# Patient Record
Sex: MALE | Race: WHITE | Employment: OTHER | URBAN - METROPOLITAN AREA
[De-identification: names, ages, dates, MRNs, and addresses within clinical notes are randomized per-mention and may not be internally consistent; named-entity substitution may affect disease eponyms.]

---

## 2018-01-03 ENCOUNTER — ANESTHESIA EVENT (OUTPATIENT)
Dept: SURGERY | Age: 68
DRG: 657 | End: 2018-01-03
Payer: MEDICARE

## 2018-01-04 ENCOUNTER — ANESTHESIA (OUTPATIENT)
Dept: SURGERY | Age: 68
DRG: 657 | End: 2018-01-04
Payer: MEDICARE

## 2018-01-04 ENCOUNTER — HOSPITAL ENCOUNTER (INPATIENT)
Age: 68
LOS: 3 days | Discharge: HOME | DRG: 657 | End: 2018-01-07
Attending: UROLOGY | Admitting: UROLOGY
Payer: MEDICARE

## 2018-01-04 PROBLEM — N28.89 RENAL MASS: Status: ACTIVE | Noted: 2018-01-04

## 2018-01-04 LAB
ABO + RH BLD: NORMAL
BLOOD GROUP ANTIBODIES SERPL: NORMAL
BUN BLD-MCNC: 24 MG/DL (ref 7–18)
CHLORIDE BLD-SCNC: 105 MMOL/L (ref 100–108)
GLUCOSE BLD STRIP.AUTO-MCNC: 113 MG/DL (ref 74–106)
HCT VFR BLD CALC: 45 % (ref 36–49)
HGB BLD-MCNC: 15.3 G/DL (ref 12–16)
POTASSIUM BLD-SCNC: 4 MMOL/L (ref 3.5–5.5)
SODIUM BLD-SCNC: 141 MMOL/L (ref 136–145)
SPECIMEN EXP DATE BLD: NORMAL

## 2018-01-04 PROCEDURE — 77030010517 HC APPL SEAL FLOSEL BAXT -B: Performed by: UROLOGY

## 2018-01-04 PROCEDURE — 77030008771 HC TU NG SALEM SUMP -A: Performed by: ANESTHESIOLOGY

## 2018-01-04 PROCEDURE — 77030010149 HC SLV TRCR ENDOSC J&J -B: Performed by: UROLOGY

## 2018-01-04 PROCEDURE — 65270000029 HC RM PRIVATE

## 2018-01-04 PROCEDURE — 76060000040 HC ANESTHESIA 4.5 TO 5 HR: Performed by: UROLOGY

## 2018-01-04 PROCEDURE — 76010000881 HC OR TIME 4.5 TO 5HR INTENSV - TIER 2: Performed by: UROLOGY

## 2018-01-04 PROCEDURE — 74011000258 HC RX REV CODE- 258

## 2018-01-04 PROCEDURE — 74011250636 HC RX REV CODE- 250/636: Performed by: UROLOGY

## 2018-01-04 PROCEDURE — 77030035684 HC CAP REDUC TRCR ENDOSC 1SEAL J&J -B: Performed by: UROLOGY

## 2018-01-04 PROCEDURE — 77030010935 HC CLP LIG ABSRB TELE -B: Performed by: UROLOGY

## 2018-01-04 PROCEDURE — 77030008683 HC TU ET CUF COVD -A: Performed by: ANESTHESIOLOGY

## 2018-01-04 PROCEDURE — 0TT04ZZ RESECTION OF RIGHT KIDNEY, PERCUTANEOUS ENDOSCOPIC APPROACH: ICD-10-PCS | Performed by: UROLOGY

## 2018-01-04 PROCEDURE — 77030012422 HC DRN WND COVD -A: Performed by: UROLOGY

## 2018-01-04 PROCEDURE — 77030008462 HC STPLR SKN PROX J&J -A: Performed by: UROLOGY

## 2018-01-04 PROCEDURE — 77030008477 HC STYL SATN SLP COVD -A: Performed by: ANESTHESIOLOGY

## 2018-01-04 PROCEDURE — 77030002896 HC SUT CLP ABSRB J&J -B: Performed by: UROLOGY

## 2018-01-04 PROCEDURE — 77030008602 HC TRCR ENDOSC EPATH J&J -B: Performed by: UROLOGY

## 2018-01-04 PROCEDURE — 77030035048 HC TRCR ENDOSC OPTCL COVD -B: Performed by: UROLOGY

## 2018-01-04 PROCEDURE — 88307 TISSUE EXAM BY PATHOLOGIST: CPT | Performed by: UROLOGY

## 2018-01-04 PROCEDURE — 74011000250 HC RX REV CODE- 250

## 2018-01-04 PROCEDURE — 77030016151 HC PROTCTR LNS DFOG COVD -B: Performed by: UROLOGY

## 2018-01-04 PROCEDURE — 77030035277 HC OBTRTR BLDELSS DISP INTU -B: Performed by: UROLOGY

## 2018-01-04 PROCEDURE — 77030002933 HC SUT MCRYL J&J -A: Performed by: UROLOGY

## 2018-01-04 PROCEDURE — 74011250636 HC RX REV CODE- 250/636: Performed by: NURSE ANESTHETIST, CERTIFIED REGISTERED

## 2018-01-04 PROCEDURE — 77030014647 HC SEAL FBRN TISSL BAXT -D: Performed by: UROLOGY

## 2018-01-04 PROCEDURE — 77030018842 HC SOL IRR SOD CL 9% BAXT -A: Performed by: UROLOGY

## 2018-01-04 PROCEDURE — 77030018390 HC SPNG HEMSTAT2 J&J -B: Performed by: UROLOGY

## 2018-01-04 PROCEDURE — 8E0W4CZ ROBOTIC ASSISTED PROCEDURE OF TRUNK REGION, PERCUTANEOUS ENDOSCOPIC APPROACH: ICD-10-PCS | Performed by: UROLOGY

## 2018-01-04 PROCEDURE — 77030007956 HC PCH ENDOSC SPEC COVD -C: Performed by: UROLOGY

## 2018-01-04 PROCEDURE — 77030013079 HC BLNKT BAIR HGGR 3M -A: Performed by: ANESTHESIOLOGY

## 2018-01-04 PROCEDURE — 77030020263 HC SOL INJ SOD CL0.9% LFCR 1000ML: Performed by: UROLOGY

## 2018-01-04 PROCEDURE — 77030010939 HC CLP LIG TELE -B: Performed by: UROLOGY

## 2018-01-04 PROCEDURE — 77030009852 HC PCH RTVR ENDOSC COVD -B: Performed by: UROLOGY

## 2018-01-04 PROCEDURE — 77030008603 HC TRCR ENDOSC EPATH J&J -C: Performed by: UROLOGY

## 2018-01-04 PROCEDURE — 77030032490 HC SLV COMPR SCD KNE COVD -B: Performed by: UROLOGY

## 2018-01-04 PROCEDURE — 77030027876 HC STPLR ENDOSC FLX PWR J&J -G1: Performed by: UROLOGY

## 2018-01-04 PROCEDURE — 74011250636 HC RX REV CODE- 250/636

## 2018-01-04 PROCEDURE — 77030035051: Performed by: UROLOGY

## 2018-01-04 PROCEDURE — 77030020268 HC MISC GENERAL SUPPLY: Performed by: UROLOGY

## 2018-01-04 PROCEDURE — 77030010513 HC APPL CLP LIG J&J -C: Performed by: UROLOGY

## 2018-01-04 PROCEDURE — 77030020703 HC SEAL CANN DISP INTU -B: Performed by: UROLOGY

## 2018-01-04 PROCEDURE — 77030010507 HC ADH SKN DERMBND J&J -B: Performed by: UROLOGY

## 2018-01-04 PROCEDURE — 77030009527 HC GEL PRT SYS AMR -E: Performed by: UROLOGY

## 2018-01-04 PROCEDURE — 86900 BLOOD TYPING SEROLOGIC ABO: CPT | Performed by: UROLOGY

## 2018-01-04 PROCEDURE — 77030034850: Performed by: UROLOGY

## 2018-01-04 PROCEDURE — 77030002966 HC SUT PDS J&J -A: Performed by: UROLOGY

## 2018-01-04 PROCEDURE — 77030002986 HC SUT PROL J&J -A: Performed by: UROLOGY

## 2018-01-04 PROCEDURE — 74011250637 HC RX REV CODE- 250/637: Performed by: UROLOGY

## 2018-01-04 PROCEDURE — 77030027491 HC SHR ENDOSC COVD -B: Performed by: UROLOGY

## 2018-01-04 PROCEDURE — 77030011640 HC PAD GRND REM COVD -A: Performed by: UROLOGY

## 2018-01-04 PROCEDURE — 77030016153 HC RELD STPLR VASC J&J -B: Performed by: UROLOGY

## 2018-01-04 PROCEDURE — 77030009848 HC PASSR SUT SET COOP -C: Performed by: UROLOGY

## 2018-01-04 PROCEDURE — 84295 ASSAY OF SERUM SODIUM: CPT

## 2018-01-04 PROCEDURE — 76210000016 HC OR PH I REC 1 TO 1.5 HR: Performed by: UROLOGY

## 2018-01-04 RX ORDER — FENTANYL CITRATE 50 UG/ML
50 INJECTION, SOLUTION INTRAMUSCULAR; INTRAVENOUS AS NEEDED
Status: DISCONTINUED | OUTPATIENT
Start: 2018-01-04 | End: 2018-01-04 | Stop reason: HOSPADM

## 2018-01-04 RX ORDER — SODIUM CHLORIDE 0.9 % (FLUSH) 0.9 %
5-10 SYRINGE (ML) INJECTION EVERY 8 HOURS
Status: DISCONTINUED | OUTPATIENT
Start: 2018-01-04 | End: 2018-01-06

## 2018-01-04 RX ORDER — GLYCOPYRROLATE 0.2 MG/ML
INJECTION INTRAMUSCULAR; INTRAVENOUS AS NEEDED
Status: DISCONTINUED | OUTPATIENT
Start: 2018-01-04 | End: 2018-01-04 | Stop reason: HOSPADM

## 2018-01-04 RX ORDER — TAMSULOSIN HYDROCHLORIDE 0.4 MG/1
0.4 CAPSULE ORAL
Status: DISCONTINUED | OUTPATIENT
Start: 2018-01-04 | End: 2018-01-07 | Stop reason: HOSPADM

## 2018-01-04 RX ORDER — SODIUM CHLORIDE 0.9 % (FLUSH) 0.9 %
5-10 SYRINGE (ML) INJECTION AS NEEDED
Status: DISCONTINUED | OUTPATIENT
Start: 2018-01-04 | End: 2018-01-07 | Stop reason: HOSPADM

## 2018-01-04 RX ORDER — DOCUSATE SODIUM 100 MG/1
100 CAPSULE, LIQUID FILLED ORAL 2 TIMES DAILY
Status: DISCONTINUED | OUTPATIENT
Start: 2018-01-04 | End: 2018-01-07 | Stop reason: HOSPADM

## 2018-01-04 RX ORDER — SODIUM CHLORIDE, SODIUM LACTATE, POTASSIUM CHLORIDE, CALCIUM CHLORIDE 600; 310; 30; 20 MG/100ML; MG/100ML; MG/100ML; MG/100ML
INJECTION, SOLUTION INTRAVENOUS
Status: DISCONTINUED | OUTPATIENT
Start: 2018-01-04 | End: 2018-01-04 | Stop reason: HOSPADM

## 2018-01-04 RX ORDER — OXYCODONE AND ACETAMINOPHEN 5; 325 MG/1; MG/1
1-2 TABLET ORAL
Status: DISCONTINUED | OUTPATIENT
Start: 2018-01-04 | End: 2018-01-07 | Stop reason: HOSPADM

## 2018-01-04 RX ORDER — HEPARIN SODIUM 5000 [USP'U]/ML
5000 INJECTION, SOLUTION INTRAVENOUS; SUBCUTANEOUS EVERY 8 HOURS
Status: DISCONTINUED | OUTPATIENT
Start: 2018-01-04 | End: 2018-01-07 | Stop reason: HOSPADM

## 2018-01-04 RX ORDER — NEOSTIGMINE METHYLSULFATE 5 MG/5 ML
SYRINGE (ML) INTRAVENOUS AS NEEDED
Status: DISCONTINUED | OUTPATIENT
Start: 2018-01-04 | End: 2018-01-04 | Stop reason: HOSPADM

## 2018-01-04 RX ORDER — OXYBUTYNIN CHLORIDE 5 MG/1
5 TABLET ORAL
Status: DISCONTINUED | OUTPATIENT
Start: 2018-01-04 | End: 2018-01-07 | Stop reason: HOSPADM

## 2018-01-04 RX ORDER — SODIUM CHLORIDE 9 MG/ML
INJECTION, SOLUTION INTRAVENOUS
Status: DISCONTINUED | OUTPATIENT
Start: 2018-01-04 | End: 2018-01-04 | Stop reason: HOSPADM

## 2018-01-04 RX ORDER — EPHEDRINE SULFATE 50 MG/ML
INJECTION, SOLUTION INTRAVENOUS AS NEEDED
Status: DISCONTINUED | OUTPATIENT
Start: 2018-01-04 | End: 2018-01-04 | Stop reason: HOSPADM

## 2018-01-04 RX ORDER — HYDROMORPHONE HYDROCHLORIDE 2 MG/ML
.5-1 INJECTION, SOLUTION INTRAMUSCULAR; INTRAVENOUS; SUBCUTANEOUS
Status: DISCONTINUED | OUTPATIENT
Start: 2018-01-04 | End: 2018-01-07 | Stop reason: HOSPADM

## 2018-01-04 RX ORDER — OXYCODONE AND ACETAMINOPHEN 5; 325 MG/1; MG/1
1 TABLET ORAL
Qty: 30 TAB | Refills: 0 | Status: SHIPPED | OUTPATIENT
Start: 2018-01-04 | End: 2018-04-17

## 2018-01-04 RX ORDER — HEPARIN SODIUM 5000 [USP'U]/ML
5000 INJECTION, SOLUTION INTRAVENOUS; SUBCUTANEOUS ONCE
Status: COMPLETED | OUTPATIENT
Start: 2018-01-04 | End: 2018-01-04

## 2018-01-04 RX ORDER — LIDOCAINE HYDROCHLORIDE 20 MG/ML
INJECTION, SOLUTION EPIDURAL; INFILTRATION; INTRACAUDAL; PERINEURAL AS NEEDED
Status: DISCONTINUED | OUTPATIENT
Start: 2018-01-04 | End: 2018-01-04 | Stop reason: HOSPADM

## 2018-01-04 RX ORDER — DOCUSATE SODIUM 100 MG/1
100 CAPSULE, LIQUID FILLED ORAL 2 TIMES DAILY
Qty: 30 CAP | Refills: 1 | Status: SHIPPED | OUTPATIENT
Start: 2018-01-04 | End: 2018-04-04

## 2018-01-04 RX ORDER — FENTANYL CITRATE 50 UG/ML
INJECTION, SOLUTION INTRAMUSCULAR; INTRAVENOUS AS NEEDED
Status: DISCONTINUED | OUTPATIENT
Start: 2018-01-04 | End: 2018-01-04 | Stop reason: HOSPADM

## 2018-01-04 RX ORDER — ONDANSETRON 2 MG/ML
INJECTION INTRAMUSCULAR; INTRAVENOUS AS NEEDED
Status: DISCONTINUED | OUTPATIENT
Start: 2018-01-04 | End: 2018-01-04 | Stop reason: HOSPADM

## 2018-01-04 RX ORDER — DEXAMETHASONE SODIUM PHOSPHATE 4 MG/ML
INJECTION, SOLUTION INTRA-ARTICULAR; INTRALESIONAL; INTRAMUSCULAR; INTRAVENOUS; SOFT TISSUE AS NEEDED
Status: DISCONTINUED | OUTPATIENT
Start: 2018-01-04 | End: 2018-01-04 | Stop reason: HOSPADM

## 2018-01-04 RX ORDER — NALOXONE HYDROCHLORIDE 0.4 MG/ML
0.4 INJECTION, SOLUTION INTRAMUSCULAR; INTRAVENOUS; SUBCUTANEOUS AS NEEDED
Status: DISCONTINUED | OUTPATIENT
Start: 2018-01-04 | End: 2018-01-07 | Stop reason: HOSPADM

## 2018-01-04 RX ORDER — SUCCINYLCHOLINE CHLORIDE 20 MG/ML
INJECTION INTRAMUSCULAR; INTRAVENOUS AS NEEDED
Status: DISCONTINUED | OUTPATIENT
Start: 2018-01-04 | End: 2018-01-04 | Stop reason: HOSPADM

## 2018-01-04 RX ORDER — LIDOCAINE HYDROCHLORIDE 10 MG/ML
0.1 INJECTION, SOLUTION EPIDURAL; INFILTRATION; INTRACAUDAL; PERINEURAL AS NEEDED
Status: DISCONTINUED | OUTPATIENT
Start: 2018-01-04 | End: 2018-01-07 | Stop reason: HOSPADM

## 2018-01-04 RX ORDER — SODIUM CHLORIDE, SODIUM LACTATE, POTASSIUM CHLORIDE, CALCIUM CHLORIDE 600; 310; 30; 20 MG/100ML; MG/100ML; MG/100ML; MG/100ML
75 INJECTION, SOLUTION INTRAVENOUS CONTINUOUS
Status: DISPENSED | OUTPATIENT
Start: 2018-01-04 | End: 2018-01-05

## 2018-01-04 RX ORDER — ONDANSETRON 2 MG/ML
4 INJECTION INTRAMUSCULAR; INTRAVENOUS ONCE
Status: DISCONTINUED | OUTPATIENT
Start: 2018-01-04 | End: 2018-01-04 | Stop reason: HOSPADM

## 2018-01-04 RX ORDER — PROPOFOL 10 MG/ML
INJECTION, EMULSION INTRAVENOUS AS NEEDED
Status: DISCONTINUED | OUTPATIENT
Start: 2018-01-04 | End: 2018-01-04 | Stop reason: HOSPADM

## 2018-01-04 RX ORDER — VECURONIUM BROMIDE FOR INJECTION 1 MG/ML
INJECTION, POWDER, LYOPHILIZED, FOR SOLUTION INTRAVENOUS AS NEEDED
Status: DISCONTINUED | OUTPATIENT
Start: 2018-01-04 | End: 2018-01-04 | Stop reason: HOSPADM

## 2018-01-04 RX ORDER — AMLODIPINE BESYLATE 5 MG/1
5 TABLET ORAL DAILY
Status: DISCONTINUED | OUTPATIENT
Start: 2018-01-05 | End: 2018-01-07 | Stop reason: HOSPADM

## 2018-01-04 RX ORDER — HYDROMORPHONE HYDROCHLORIDE 2 MG/ML
INJECTION, SOLUTION INTRAMUSCULAR; INTRAVENOUS; SUBCUTANEOUS AS NEEDED
Status: DISCONTINUED | OUTPATIENT
Start: 2018-01-04 | End: 2018-01-04 | Stop reason: HOSPADM

## 2018-01-04 RX ORDER — SODIUM CHLORIDE, SODIUM LACTATE, POTASSIUM CHLORIDE, CALCIUM CHLORIDE 600; 310; 30; 20 MG/100ML; MG/100ML; MG/100ML; MG/100ML
50 INJECTION, SOLUTION INTRAVENOUS CONTINUOUS
Status: DISCONTINUED | OUTPATIENT
Start: 2018-01-04 | End: 2018-01-04 | Stop reason: HOSPADM

## 2018-01-04 RX ORDER — MIDAZOLAM HYDROCHLORIDE 1 MG/ML
INJECTION, SOLUTION INTRAMUSCULAR; INTRAVENOUS AS NEEDED
Status: DISCONTINUED | OUTPATIENT
Start: 2018-01-04 | End: 2018-01-04 | Stop reason: HOSPADM

## 2018-01-04 RX ORDER — ZOLPIDEM TARTRATE 5 MG/1
5 TABLET ORAL
Status: DISCONTINUED | OUTPATIENT
Start: 2018-01-04 | End: 2018-01-07 | Stop reason: HOSPADM

## 2018-01-04 RX ORDER — HYDROMORPHONE HYDROCHLORIDE 2 MG/ML
0.5 INJECTION, SOLUTION INTRAMUSCULAR; INTRAVENOUS; SUBCUTANEOUS
Status: DISCONTINUED | OUTPATIENT
Start: 2018-01-04 | End: 2018-01-04 | Stop reason: HOSPADM

## 2018-01-04 RX ORDER — CIPROFLOXACIN 2 MG/ML
400 INJECTION, SOLUTION INTRAVENOUS EVERY 12 HOURS
Status: DISCONTINUED | OUTPATIENT
Start: 2018-01-04 | End: 2018-01-07

## 2018-01-04 RX ORDER — ONDANSETRON 2 MG/ML
4 INJECTION INTRAMUSCULAR; INTRAVENOUS
Status: DISCONTINUED | OUTPATIENT
Start: 2018-01-04 | End: 2018-01-07 | Stop reason: HOSPADM

## 2018-01-04 RX ORDER — CLINDAMYCIN PHOSPHATE 900 MG/50ML
900 INJECTION INTRAVENOUS
Status: COMPLETED | OUTPATIENT
Start: 2018-01-04 | End: 2018-01-05

## 2018-01-04 RX ORDER — DIPHENHYDRAMINE HYDROCHLORIDE 50 MG/ML
12.5 INJECTION, SOLUTION INTRAMUSCULAR; INTRAVENOUS
Status: DISCONTINUED | OUTPATIENT
Start: 2018-01-04 | End: 2018-01-07 | Stop reason: HOSPADM

## 2018-01-04 RX ADMIN — VECURONIUM BROMIDE FOR INJECTION 2 MG: 1 INJECTION, POWDER, LYOPHILIZED, FOR SOLUTION INTRAVENOUS at 09:54

## 2018-01-04 RX ADMIN — VECURONIUM BROMIDE FOR INJECTION 4 MG: 1 INJECTION, POWDER, LYOPHILIZED, FOR SOLUTION INTRAVENOUS at 09:20

## 2018-01-04 RX ADMIN — FENTANYL CITRATE 50 MCG: 50 INJECTION, SOLUTION INTRAMUSCULAR; INTRAVENOUS at 09:50

## 2018-01-04 RX ADMIN — VECURONIUM BROMIDE FOR INJECTION 1 MG: 1 INJECTION, POWDER, LYOPHILIZED, FOR SOLUTION INTRAVENOUS at 11:54

## 2018-01-04 RX ADMIN — Medication 10 ML: at 21:01

## 2018-01-04 RX ADMIN — SUCCINYLCHOLINE CHLORIDE 140 MG: 20 INJECTION INTRAMUSCULAR; INTRAVENOUS at 09:14

## 2018-01-04 RX ADMIN — LIDOCAINE HYDROCHLORIDE 50 MG: 20 INJECTION, SOLUTION EPIDURAL; INFILTRATION; INTRACAUDAL; PERINEURAL at 09:14

## 2018-01-04 RX ADMIN — SODIUM CHLORIDE, SODIUM LACTATE, POTASSIUM CHLORIDE, CALCIUM CHLORIDE: 600; 310; 30; 20 INJECTION, SOLUTION INTRAVENOUS at 13:10

## 2018-01-04 RX ADMIN — VECURONIUM BROMIDE FOR INJECTION 2 MG: 1 INJECTION, POWDER, LYOPHILIZED, FOR SOLUTION INTRAVENOUS at 12:22

## 2018-01-04 RX ADMIN — FENTANYL CITRATE 100 MCG: 50 INJECTION, SOLUTION INTRAMUSCULAR; INTRAVENOUS at 09:14

## 2018-01-04 RX ADMIN — VECURONIUM BROMIDE FOR INJECTION 1 MG: 1 INJECTION, POWDER, LYOPHILIZED, FOR SOLUTION INTRAVENOUS at 10:32

## 2018-01-04 RX ADMIN — GLYCOPYRROLATE 0.1 MG: 0.2 INJECTION INTRAMUSCULAR; INTRAVENOUS at 13:37

## 2018-01-04 RX ADMIN — FENTANYL CITRATE 50 MCG: 50 INJECTION, SOLUTION INTRAMUSCULAR; INTRAVENOUS at 13:09

## 2018-01-04 RX ADMIN — DOCUSATE SODIUM 100 MG: 100 CAPSULE, LIQUID FILLED ORAL at 17:09

## 2018-01-04 RX ADMIN — EPHEDRINE SULFATE 10 MG: 50 INJECTION, SOLUTION INTRAVENOUS at 09:58

## 2018-01-04 RX ADMIN — CIPROFLOXACIN 400 MG: 2 INJECTION, SOLUTION INTRAVENOUS at 20:59

## 2018-01-04 RX ADMIN — MIDAZOLAM HYDROCHLORIDE 2 MG: 1 INJECTION, SOLUTION INTRAMUSCULAR; INTRAVENOUS at 09:09

## 2018-01-04 RX ADMIN — SODIUM CHLORIDE, SODIUM LACTATE, POTASSIUM CHLORIDE, AND CALCIUM CHLORIDE 75 ML/HR: 600; 310; 30; 20 INJECTION, SOLUTION INTRAVENOUS at 20:59

## 2018-01-04 RX ADMIN — SODIUM CHLORIDE, SODIUM LACTATE, POTASSIUM CHLORIDE, AND CALCIUM CHLORIDE: 600; 310; 30; 20 INJECTION, SOLUTION INTRAVENOUS at 12:29

## 2018-01-04 RX ADMIN — Medication 10 ML: at 17:10

## 2018-01-04 RX ADMIN — OXYCODONE HYDROCHLORIDE AND ACETAMINOPHEN 2 TABLET: 5; 325 TABLET ORAL at 17:10

## 2018-01-04 RX ADMIN — DEXAMETHASONE SODIUM PHOSPHATE 8 MG: 4 INJECTION, SOLUTION INTRA-ARTICULAR; INTRALESIONAL; INTRAMUSCULAR; INTRAVENOUS; SOFT TISSUE at 10:06

## 2018-01-04 RX ADMIN — HYDROMORPHONE HYDROCHLORIDE 1 MG: 2 INJECTION INTRAMUSCULAR; INTRAVENOUS; SUBCUTANEOUS at 23:46

## 2018-01-04 RX ADMIN — OXYBUTYNIN CHLORIDE 5 MG: 5 TABLET ORAL at 21:13

## 2018-01-04 RX ADMIN — FENTANYL CITRATE 25 MCG: 50 INJECTION, SOLUTION INTRAMUSCULAR; INTRAVENOUS at 13:53

## 2018-01-04 RX ADMIN — FENTANYL CITRATE 50 MCG: 50 INJECTION, SOLUTION INTRAMUSCULAR; INTRAVENOUS at 09:19

## 2018-01-04 RX ADMIN — SODIUM CHLORIDE, SODIUM LACTATE, POTASSIUM CHLORIDE, AND CALCIUM CHLORIDE: 600; 310; 30; 20 INJECTION, SOLUTION INTRAVENOUS at 09:10

## 2018-01-04 RX ADMIN — EPHEDRINE SULFATE 10 MG: 50 INJECTION, SOLUTION INTRAVENOUS at 10:28

## 2018-01-04 RX ADMIN — OXYCODONE HYDROCHLORIDE AND ACETAMINOPHEN 2 TABLET: 5; 325 TABLET ORAL at 21:13

## 2018-01-04 RX ADMIN — SODIUM CHLORIDE: 9 INJECTION, SOLUTION INTRAVENOUS at 09:30

## 2018-01-04 RX ADMIN — TAMSULOSIN HYDROCHLORIDE 0.4 MG: 0.4 CAPSULE ORAL at 17:09

## 2018-01-04 RX ADMIN — SODIUM CHLORIDE, SODIUM LACTATE, POTASSIUM CHLORIDE, AND CALCIUM CHLORIDE 75 ML/HR: 600; 310; 30; 20 INJECTION, SOLUTION INTRAVENOUS at 17:13

## 2018-01-04 RX ADMIN — HYDROMORPHONE HYDROCHLORIDE 0.5 MG: 2 INJECTION, SOLUTION INTRAMUSCULAR; INTRAVENOUS; SUBCUTANEOUS at 09:53

## 2018-01-04 RX ADMIN — ONDANSETRON 4 MG: 2 INJECTION INTRAMUSCULAR; INTRAVENOUS at 13:12

## 2018-01-04 RX ADMIN — VECURONIUM BROMIDE FOR INJECTION 1 MG: 1 INJECTION, POWDER, LYOPHILIZED, FOR SOLUTION INTRAVENOUS at 09:33

## 2018-01-04 RX ADMIN — HEPARIN SODIUM 5000 UNITS: 5000 INJECTION, SOLUTION INTRAVENOUS; SUBCUTANEOUS at 08:31

## 2018-01-04 RX ADMIN — HYDROMORPHONE HYDROCHLORIDE 0.5 MG: 2 INJECTION, SOLUTION INTRAMUSCULAR; INTRAVENOUS; SUBCUTANEOUS at 10:53

## 2018-01-04 RX ADMIN — PROPOFOL 200 MG: 10 INJECTION, EMULSION INTRAVENOUS at 09:14

## 2018-01-04 RX ADMIN — HEPARIN SODIUM 5000 UNITS: 5000 INJECTION, SOLUTION INTRAVENOUS; SUBCUTANEOUS at 17:09

## 2018-01-04 RX ADMIN — CLINDAMYCIN PHOSPHATE 900 MG: 18 INJECTION, SOLUTION INTRAMUSCULAR; INTRAVENOUS at 08:44

## 2018-01-04 RX ADMIN — FENTANYL CITRATE 25 MCG: 50 INJECTION, SOLUTION INTRAMUSCULAR; INTRAVENOUS at 13:52

## 2018-01-04 RX ADMIN — CIPROFLOXACIN 400 MG: 2 INJECTION, SOLUTION INTRAVENOUS at 09:25

## 2018-01-04 RX ADMIN — Medication 3 MG: at 13:37

## 2018-01-04 RX ADMIN — VECURONIUM BROMIDE FOR INJECTION 1 MG: 1 INJECTION, POWDER, LYOPHILIZED, FOR SOLUTION INTRAVENOUS at 11:06

## 2018-01-04 NOTE — BRIEF OP NOTE
BRIEF OPERATIVE NOTE    Date of Procedure: 1/4/2018   Preoperative Diagnosis: renal mass right bilateral kidney stones n28.89 n20.0  Postoperative Diagnosis: * No post-op diagnosis entered *    Procedure(s):  RIGHT ROBOTIC ASSISTED LAPAROSCOPIC NEPHRECTOMY, radical  Surgeon(s) and Role:     * Gisele Campos MD - Primary       Rica Bailey - Resident         Assistant Staff:       Surgical Staff:  Circ-1: Nita Madera RN  Circ-2: Merline River, RN  Scrub Tech-1: Phuc Regulus  Surg Asst-1: Tura Joes  Event Time In   Incision Start  9:51 AM   Incision Close  1:58 PM     Anesthesia: General   Estimated Blood Loss: 50 mL  Specimens:   ID Type Source Tests Collected by Time Destination   1 : Janae Palomares MD 1/4/2018 12:59 PM Pathology      Findings: see dictated report   Complications: none  Implants: * No implants in log *   1. 16 Fr jacobs catheter    Concha Salcido MD  Urology, PGY-5  Pager: [phone]  1/4/2018, 2:14 PM

## 2018-01-04 NOTE — H&P
History and physical review. The patient has been examined. There have been no significant clinical changes. NAD, CTAB, RRR    R Side marked  Cipro, clinda On call to [address] [place] [address] to proceed with Right Robotic Assisted Nephrectomy        ASSESSMENT:   1. Right renal mass    2. Bilateral kidney stones    3. Ureteral stone          1. Right Renal Mass - Noted on CT 10/05/17 while assessing left flank pain and microhematuria. CT showed a 10cm right renal mass. Follow U/S 10/17/17 showed a large 11.4 cm right renal lower pole predominately solid complex mass. Creatinine 1.0 on 10/18/17. MRI abdomen on 11/28/17 showed a large 10 x 9.6 x 11.4 cm right renal lower pole solid mass consistent with neoplasm.  No CT evidence for venous tumor extension or neoplastic lymphadenopathy. CT chest on 11/28/17 showed no CT evidence for pulmonary metastases or pathologically enlarged lymph nodes.     2. Bilateral ureteral stones - CT ABD PELV WO Contrast 10/05/17 showed 2 right UVJ stones (largest 8mm) and a 2mm proximal non-obstructing left ureteral stone     3. Bilateral Kidney stones - CT 10/05/17 showed bilateral non-obstructing kidney stones (largest on the right measuring, 1.2cm, 1.3cm, and 0.9cm).     4. Liver mass. CT chest on 11/28/17 showed approximate 4 cm left hepatic lobe lateral subsegment mass worrisome for liver metastasis. MRI abdomen on 11/28/17 revealed approximate 4 cm high T2 signal mass in the upper left hepatic lobe lateral subsegment , possibly a benign cavernous hemangioma.  If no prior studies are available for comparison, this could be further evaluated with nuclear medicine SPECT tagged red blood cell study.     PLAN:    · Reviewed CT and MRI reports and images with patient today.   · Discussed with radiology tagged RBC scan to determine whether liver mass is a hemangioma or malignancy. · NM SPECT tagged red blood cell study ordered for further evaluation of liver mass. · CT abd/pelv WO contrast ordered for reevaluation of kidney stones. · If tagged RBC scan is negative, will not need to address liver mass further and will proceed with right radical nephrectomy. If tagged RBC scan is concerning of malignancy, will need to consider biopsying liver mass possibly at time of surgery  · Discussed right lap/robotic (possible open) nephrectomy in detail   · Discussed increased risks of acute renal failure during right nephrectomy with a left ureteral stone present. · Will proceed with Right robotic assisted radical nephrectomy with left ureteroscopy since stone is non-obstructing, 2mm. Cystoscopy/ureteroscopy in the OR if stone still present. · Consents signed today.             Discussion:   We discussed the risks and benefits of laparoscopic nephrectomy including, but not limited to, infection, bleeding, blood transfusion, possible conversion to open nephrectomy, possible injury to surrounding organs requiring immediate or delayed repair, possible benign path or positive surgical margins, chronic kidney disease with subsequent increased risk of cardiovascular morbidity and mortality, and global anesthesia risks including but not limited to CVA, MI, DVT, PE, pneumonia, and death. The patient expressed understanding and desire to proceed.             Chief Complaint   Patient presents with    Renal Mass       HW right robotic assisted lap nephrectomy, possible open, cysto, left RPG, left URS, laser litho, stone extraction, left stent placement 12/13/17.         HISTORY OF PRESENT ILLNESS:  Teressa Orozco is a 74 y.o. male who returns today for hospital work-up in preparation for right nephrectomy and left URS for treatment of right renal mass, bilateral kidney stones, and bilateral ureteral stones.    CT, while assessing left flank pain and microhematuria, showed a 10cm heterogenous low density well circumscribed right renal mass, right UVJ stones (largest 8mm), a 2mm proximal non-obstructing left ureteral stone, and bilateral non-obstructing kidney stones (largest on the right measuring, 1.2cm, 1.3cm, and 0.9cm). Follow up U/S 10/17/17 showed a large 11.4 cm right renal lower pole predominately solid complex mass.     MRI abdomen on 11/28/17 showed a large 10 x 9.6 x 11.4 cm right renal lower pole solid mass consistent with neoplasm.  No CT evidence for venous tumor extension or neoplastic lymphadenopathy. CT chest on 11/28/17 showed no CT evidence for pulmonary metastases or pathologically enlarged lymph nodes.     Additionally, MRI abdomen on 11/28/17 also revealed approximate 4 cm high T2 signal mass in the upper left hepatic lobe lateral subsegment , possibly a benign cavernous hemangioma.  If no prior studies are available for comparison, this could be further evaluated with nuclear medicine SPECT tagged red blood cell study.     Patient reports that he feels relatively well and denies passing any stones since his visit with Dr. Ervin Correa. He also denies any flank pain but reports bilateral lower back pain that remains unchanged since he was last seen. Denies any bothersome urinary symptoms. Denies any visual changes or changes in cognition. No family hx of kidney stones  No fevers. C/o some hematuria  Hx of tobacco use     Most recent Creatinine was 1.0 and Calcium 9.0 on 10/18/17.       Also report history of hypertension and hyperlipidemia. Denies any history of CAD.       Denies any surgical history      AUA Symptom Score 12/6/2017   Over the past month how often have you had the sensation that your bladder was not completely empty after you finished urinating? 1   Over the past month, how often have had to urinate again less than 2 hours after you last finished urinating?  2   Over the past month, how often have you found you stopped and started again several times when you urinated? 2   Over the past month, how often have you found it difficult to postpone urination? 0   Over the past month, how often have you had a weak urinary stream? 3   Over the past month, how often have you had to push or strain to begin urinating? 0   Over the past month, how many times did you most typically get up to urinate from the time you went to bed at night until the time you got up in the morning? 3   AUA Score 11   If you were to spend the rest of your life with your urinary condition the way it is now, how would you feel about that? Mixed-about equally satisfied                 Past Medical History:   Diagnosis Date    High cholesterol      Hypertension                 Past Surgical History:   Procedure Laterality Date    HX ACL RECONSTRUCTION Bilateral                   Social History   Substance Use Topics    Smoking status: Never Smoker    Smokeless tobacco: Former User       Types: Chew       Quit date: 10/16/2016    Alcohol use Yes          Comment: occasional               Allergies   Allergen Reactions    Niacin Other (comments)       Increases blood pressure    Penicillins Unable to Obtain               Family History   Problem Relation Age of Onset    Cancer Mother      Kidney Disease Father      Heart Disease Sister                  Current Outpatient Prescriptions   Medication Sig Dispense Refill    amLODIPine (NORVASC) 5 mg tablet          calcipotriene-betamethasone (TACLONEX) 0.005-0.064 % topical ointment APPLY THIN LAYER TO PSORIASIS OF BODY AS NEEDED TWICE A DAY X 2-4 WEEKS   2    ELIDEL 1 % topical cream APPLY TO FACE AS NEEDED FOR RASH.   2    tamsulosin (FLOMAX) 0.4 mg capsule Take 1 Cap by mouth daily (after dinner).  30 Cap 12         Review of Systems  Constitutional: Fever: No  Skin: Rash: No  HEENT: Hearing difficulty: No  Eyes: Blurred vision: No  Cardiovascular: Chest pain: No  Respiratory: Shortness of breath: No  Gastrointestinal: Nausea/vomiting: No  Musculoskeletal: Back pain: No  Neurological: Weakness: No  Psychological: Memory loss: No  Comments/additional findings:            PHYSICAL EXAMINATION:        Visit Vitals    /82    Ht 5' 10\" (1.778 m)    Wt 244 lb (110.7 kg)    BMI 35.01 kg/m2      Constitutional: WDWN, Pleasant and appropriate affect, No acute distress. CV:  No peripheral swelling noted  Respiratory: No respiratory distress or difficulties  Abdomen:  No abdominal masses or tenderness. No CVA tenderness. No inguinal hernias noted. Neuro/Psych:  Alert and oriented x 3, affect appropriate. Lymphatic:   No enlarged inguinal lymph nodes.          REVIEW OF LABS AND IMAGING:          Results for orders placed or performed in visit on 12/06/17   AMB POC URINALYSIS DIP STICK AUTO W/O MICRO   Result Value Ref Range     Color (UA POC)         Clarity (UA POC)         Glucose (UA POC) Negative Negative     Bilirubin (UA POC) Negative Negative     Ketones (UA POC) Negative Negative     Specific gravity (UA POC) 1.010 1.001 - 1.035     Blood (UA POC) 2+ Negative     pH (UA POC) 6.0 4.6 - 8.0     Protein (UA POC) Negative Negative     Urobilinogen (UA POC) 0.2 mg/dL 0.2 - 1     Nitrites (UA POC) Negative Negative     Leukocyte esterase (UA POC) Negative Negative      MRI abdomen W/WO contrast 11/28/17  Kidneys/ureters: Dardanelle Rolandos is a large 10 x 9.6 x 11.4 cm well-defined lobular solid mass projecting medially from the lower right kidney.  The mass shows heterogeneous contrast enhancement.  No visible tumor extension into the right renal vein or inferior vena cava is seen. Dardanelle Hanks is a 3.6 cm simple cyst in the lateral right renal lower pole.  There is a 5.5 cm simple cyst projecting posteriorly from the mid left kidney.  Several subcentimeter sized cysts are also seen in each kidney.  There is CT evidence for right mid pole calculi.  No hydronephrosis is seen. IMPRESSION:  1.  Large 10 x 9.6 x 11.4 cm right renal lower pole solid mass consistent with neoplasm.  No CT evidence for venous tumor extension or neoplastic lymphadenopathy. 2. Approximate 4 cm high T2 signal mass in the upper left hepatic lobe lateral subsegment , possibly a benign cavernous hemangioma.  If no prior studies are available for comparison, this could be further evaluated with nuclear medicine SPECT tagged red blood cell study.     CT chest W contrast 11/28/17  Upper abdomen:   There is an approximate 4 cm mass in the upper left hepatic lobe lateral subsegment worrisome for any hepatic metastasis.  Multiple small cysts are seen in the liver.  Visualized portion of the right kidney shows two calculi in the midportion measuring 10 mm and 9 mm each.  The lower pole mass is only partially seen.  Simple cysts are seen in the visualized left kidney measuring up to 5.3 cm in the posterior midportion. IMPRESSION:  1. No CT evidence for pulmonary metastases or pathologically enlarged lymph nodes. 2. Approximate 4 cm left hepatic lobe lateral subsegment mass worrisome for liver metastasis. 3. Small liver cysts. 4. Right intrarenal calculi.  Partially visualized right renal lower pole mass.   5. Left renal cysts.         A copy of today's office visit with all pertinent imaging results and labs were sent to the referring physician.    Caro Moran MD

## 2018-01-04 NOTE — OP NOTES
[facility]  OPERATIVE REPORT    Heath Mckeon  MR#: [id number]  : 1950  ACCOUNT #: [de-identified]   DATE OF SERVICE: 2018    SURGEON:  Cheryl Murrell MD    PREOPERATIVE DIAGNOSIS:  Right renal mass. POSTOPERATIVE DIAGNOSIS:  Right renal mass. PROCEDURE PERFORMED:  Robotic-assisted laparoscopic radical nephrectomy, adrenal-sparing. ASSISTANT:  Sayda Root MD    ANESTHESIA:  General.    FLUIDS:  2 liters crystalloid. ESTIMATED BLOOD LOSS:  50 mL. SPECIMENS REMOVED:  Right renal mass. DRAINS:  16-La[place] Gu catheter. INDICATION FOR THE PROCEDURE:  The patient is a 73-year-old male with a history of a large 12-cm right renal mass. He was also noted to have a questionable lesion in his liver that was biopsied and found to be a benign hemangioma. Risks, benefits and alternatives of the above stated procedure were explained to the patient, the patient decided to proceed. DETAILS OF THE PROCEDURE:  The patient was first identified in the holding area and taken back to the operating room. Perioperative antibiotics were given and sequential compression devices were placed. Anesthesia was induced. The patient was placed in the flank position with his right side up, taking care to pad all pressure points. The patient was then prepped and draped in the usual sterile fashion. A timeout was performed. First, a Veress needle was used to gain access into the abdomen. Drop test was performed and the abdomen was insufflated to 15 mmHg. Once this was done, we used the visualizing port to gain access to the abdomen approximately 17 cm caudal to the costal margin at the midclavicular line. Once this was done, we surveyed the abdomen. We then placed our ports in a standard dice configuration with a dog ear for a robotic 8-arm port.   Once all port sites were placed under direct vision and no harm was noted to any visceral structures, the [place] [address] system was docked. Scissors were used in the right arm, a fenestrated bipolar in the left arm and a ProGrasp in the fourth arm. We started by taking down the colon. Once this was done, we identified the duodenum and kocherized it. The vena cava was identified along with the gonadal vein, which was medialized. We then identified the ureter and got a lift underneath the kidney. We then proceeded to make our way towards the hilum. The renal artery and vein were identified and completely encircled. The artery was taken first with a 45-mm stapler with a gray load. The vein was then taken with a 45-mm stapler with gray load. We then freed the posterior and lateral attachments. The size of the kidney made it somewhat difficult to maneuver and we did at one point make a small puncture into the tumor. We therefore freed the rest of the attachments up and got the kidney completely free. Because of the size of the kidney, we decided to make a 9-cm incision and using a wound protector and a Gelport, we placed the kidney in a bowel bag. Once this was done, we delivered it through our 9-mm modified Orozco incision in the right lower quadrant. Once this was done, we copiously irrigated with 6 liters of sterile water. We then proceeded to apply FloSeal and Surgicel to the resection bed. The pressure was taken down to 7 and there were no signs of bleeding. Once this was done, our two 12-mm port sites were closed down. We had preplaced Ronal-Bessy sutures in this location. Once this was done, we proceeded to close our [place] incision, first with a 0 Vicryl in a running fashion closing the peritoneum, followed by a looped #1 PDS in a running fashion. Once this was done, we irrigated all port sites out copiously and infiltrated 0.25% Marcaine. All skin sites were closed with a running Monocryl in a subcuticular fashion. The patient was awakened from anesthesia and taken back to the PACU in stable condition.   Of note, I was scrubbed and present for all critical portions of the case.       Nadiya Cedeno MD       [address],[address] [facility] / [email or web address]  D: 01/04/2018 14:07     T: 01/04/2018 15:04  JOB #: [id number]

## 2018-01-04 NOTE — ANESTHESIA POSTPROCEDURE EVALUATION
Post-Anesthesia Evaluation and Assessment    Patient: Popeye Laird MRN: [id number]  SSN: [id number]    YOB: 1950  Age: 74 y.o. Sex: male       Cardiovascular Function/Vital Signs  Visit Vitals    /80    Pulse 90    Temp 37.1 °C (98.7 °F)    Resp 17    Ht 5' 10\" (1.778 m)    Wt 112.9 kg (249 lb)    SpO2 97%    BMI 35.73 kg/m2       Patient is status post general anesthesia for Procedure(s):  RIGHT ROBOTIC ASSISTED LAPAROSCOPIC NEPHRECTOMY POSSIBLE OPEN, CYSTOSCOPY LEFT RETROGRADE PYELOGRAM LEFT URETEROSCOPY LASER LITHOTRIPSY, STONE EXTRACTION LEFT STENT PLACEMENT. Nausea/Vomiting: None    Postoperative hydration reviewed and adequate. Pain:  Pain Scale 1: Numeric (0 - 10) (01/04/18 1445)  Pain Intensity 1: 0 (01/04/18 1445)   Managed    Neurological Status:   Neuro (WDL): Within Defined Limits (01/04/18 1410)   At baseline    Mental Status and Level of Consciousness: Alert and oriented     Pulmonary Status:   O2 Device: Room air (01/04/18 1445)   Adequate oxygenation and airway patent    Complications related to anesthesia: None    Post-anesthesia assessment completed.  No concerns    Signed By: Sola Forrest CRNA     January 4, 2018

## 2018-01-04 NOTE — ANESTHESIA PREPROCEDURE EVALUATION
Anesthetic History   No history of anesthetic complications            Review of Systems / Medical History  Patient summary reviewed and pertinent labs reviewed    Pulmonary  Within defined limits                 Neuro/Psych   Within defined limits           Cardiovascular    Hypertension: well controlled              Exercise tolerance: >4 METS     GI/Hepatic/Renal                Endo/Other             Other Findings   Comments:   Risk Factors for Postoperative nausea/vomiting:       History of postoperative nausea/vomiting? NO       Female? NO       Motion sickness? NO       Intended opioid administration for postoperative analgesia? YES      Smoking Abstinence  Current Smoker? NO  Elective Surgery? YES  Seen preoperatively by anesthesiologist or proxy prior to day of surgery? YES  Pt abstained from smoking 24 hours prior to anesthesia?  N/A           Physical Exam    Airway  Mallampati: II  TM Distance: 4 - 6 cm  Neck ROM: normal range of motion   Mouth opening: Normal     Cardiovascular    Rhythm: regular  Rate: normal         Dental  No notable dental hx       Pulmonary  Breath sounds clear to auscultation               Abdominal  GI exam deferred       Other Findings            Anesthetic Plan    ASA: 3  Anesthesia type: general          Induction: Intravenous  Anesthetic plan and risks discussed with: Patient

## 2018-01-04 NOTE — IP AVS SNAPSHOT
[address] Patient: Winston Santillan 
MRN: [id number] About your hospitalization You were admitted on:  January 4, 2018 You last received care in the:  [address] [place] [address],[address] You were discharged on:  January 7, 2018 Why you were hospitalized Your primary diagnosis was:  Not on File Your diagnoses also included:  Renal Mass Follow-up Information Follow up With Details Comments Contact Info Provider Unknown   Patient not available to ask Terry Rogers MD In 2 weeks Follow up appointment [address] [facility] [address] 
[phone] Your Scheduled Appointments Tuesday February 06, 2018  1:00 PM EST  
ESTABLISHED PATIENT with Terry Rogers MD  
[facility] of [facility] [address] [facility]) [address] [facility] [address]  
[phone] Discharge Orders None A check tuyet indicates which time of day the medication should be taken. My Medications START taking these medications Instructions Each Dose to Equal  
 Morning Noon Evening Bedtime  
 docusate sodium 100 mg capsule Commonly known as:  Nayely Hernandez Your last dose was: Your next dose is: Take 1 Cap by mouth two (2) times a day for 90 days. 100 mg  
    
   
   
   
  
 oxyCODONE-acetaminophen 5-325 mg per tablet Commonly known as:  PERCOCET Your last dose was: Your next dose is: Take 1 Tab by mouth every four (4) hours as needed for Pain. Max Daily Amount: 6 Tabs. 1 Tab CONTINUE taking these medications Instructions Each Dose to Equal  
 Morning Noon Evening Bedtime  
 amLODIPine 5 mg tablet Commonly known as:  Willard Paredes Your last dose was: Your next dose is:    
   
   
 5 mg daily. 5 mg calcipotriene-betamethasone 0.005-0.064 % topical ointment Commonly known as:  Hayder Sonia Your last dose was: Your next dose is:    
   
   
 APPLY THIN LAYER TO PSORIASIS OF BODY AS NEEDED TWICE A DAY X 2-4 WEEKS  
     
   
   
   
  
 ELIDEL 1 % topical cream  
Generic drug:  pimecrolimus Your last dose was: Your next dose is:    
   
   
 APPLY TO FACE AS NEEDED FOR RASH.  
     
   
   
   
  
 tamsulosin 0.4 mg capsule Commonly known as:  FLOMAX Your last dose was: Your next dose is: Take 1 Cap by mouth daily (after dinner). 0.4 mg Where to Get Your Medications Information on where to get these meds will be given to you by the nurse or doctor. ! Ask your nurse or doctor about these medications  
  docusate sodium 100 mg capsule  
 oxyCODONE-acetaminophen 5-325 mg per tablet Discharge Instructions DISCHARGE SUMMARY from Nurse PATIENT INSTRUCTIONS: 
 
After general anesthesia or intravenous sedation, for 24 hours or while taking prescription Narcotics: · Limit your activities · Do not drive and operate hazardous machinery · Do not make important personal or business decisions · Do  not drink alcoholic beverages · If you have not urinated within 8 hours after discharge, please contact your surgeon on call. Report the following to your surgeon: 
· Excessive pain, swelling, redness or odor of or around the surgical area · Temperature over 100.5 · Nausea and vomiting lasting longer than 4 hours or if unable to take medications · Any signs of decreased circulation or nerve impairment to extremity: change in color, persistent  numbness, tingling, coldness or increase pain · Any questions What to do at Home: 
Recommended activity: Activity as tolerated, Ambulate in house, No lifting, Driving, or Strenuous exercise until cleared by Dr. Mehran Barksdale, No driving while on analgesics and See surgical instructions. If you experience any of the following symptoms fever greater than 100.4, nausea and vomiting lasting for more than 4 hours, signs of wound infections (i.e. Increased wound drainage, odor, redness, warmth, swelling), numbness/tingling in extremities, change in color in arms and legs, unable to urinate for 8 hours or more, or uncontrolled pain, please follow up with Dr. Sonny Gandara. *  Please give a list of your current medications to your Primary Care Provider. *  Please update this list whenever your medications are discontinued, doses are 
    changed, or new medications (including over-the-counter products) are added. *  Please carry medication information at all times in case of emergency situations. These are general instructions for a healthy lifestyle: No smoking/ No tobacco products/ Avoid exposure to second hand smoke Surgeon General's Warning:  Quitting smoking now greatly reduces serious risk to your health. Obesity, smoking, and sedentary lifestyle greatly increases your risk for illness A healthy diet, regular physical exercise & weight monitoring are important for maintaining a healthy lifestyle You may be retaining fluid if you have a history of heart failure or if you experience any of the following symptoms:  Weight gain of 3 pounds or more overnight or 5 pounds in a week, increased swelling in our hands or feet or shortness of breath while lying flat in bed. Please call your doctor as soon as you notice any of these symptoms; do not wait until your next office visit. Recognize signs and symptoms of STROKE: 
 
F-face looks uneven A-arms unable to move or move unevenly S-speech slurred or non-existent T-time-call 911 as soon as signs and symptoms begin-DO NOT go Back to bed or wait to see if you get better-TIME IS BRAIN. Warning Signs of HEART ATTACK Call 911 if you have these symptoms: ? Chest discomfort. Most heart attacks involve discomfort in the center of the chest that lasts more than a few minutes, or that goes away and comes back. It can feel like uncomfortable pressure, squeezing, fullness, or pain. ? Discomfort in other areas of the upper body. Symptoms can include pain or discomfort in one or both arms, the back, neck, jaw, or stomach. ? Shortness of breath with or without chest discomfort. ? Other signs may include breaking out in a cold sweat, nausea, or lightheadedness. Don't wait more than five minutes to call [address]! Fast action can save your life. Calling 911 is almost always the fastest way to get lifesaving treatment. Emergency Medical Services staff can begin treatment when they arrive  up to an hour sooner than if someone gets to the hospital by car. The discharge information has been reviewed with the patient and spouse. The patient and spouse verbalized understanding. Discharge medications reviewed with the patient and spouse and appropriate educational materials and side effects teaching were provided. ___________________________________________________________________________________________________________________________________ Patient armband removed and shredded. [email or web address] Announcement We are excited to announce that we are making your provider's discharge notes available to you in [email or web address]. You will see these notes when they are completed and signed by the physician that discharged you from your recent hospital stay. If you have any questions or concerns about any information you see in [email or web address], please call the Health Information Department where you were seen or reach out to your Primary Care Provider for more information about your plan of care. Introducing [facility]!    
 Monique Tamayo introduces [email or web address] patient portal. Now you can access parts of your medical record, email your doctor's office, and request medication refills online. 1. In your internet browser, go to [email or web address] 2. Click on the First Time User? Click Here link in the Sign In box. You will see the New Member Sign Up page. 3. Enter your Distill Access Code exactly as it appears below. You will not need to use this code after youve completed the sign-up process. If you do not sign up before the expiration date, you must request a new code. · Distill Access Code: [email or web address] Expires: 1/14/2018  7:50 AM 
 
4. Enter the last four digits of your Social Security Number (xxxx) and Date of Birth (mm/dd/yyyy) as indicated and click Submit. You will be taken to the next sign-up page. 5. Create a Distill ID. This will be your Distill login ID and cannot be changed, so think of one that is secure and easy to remember. 6. Create a Distill password. You can change your password at any time. 7. Enter your Password Reset Question and Answer. This can be used at a later time if you forget your password. 8. Enter your e-mail address. You will receive e-mail notification when new information is available in [address] 9. Click Sign Up. You can now view and download portions of your medical record. 10. Click the Download Summary menu link to download a portable copy of your medical information. If you have questions, please visit the Frequently Asked Questions section of the Distill website. Remember, [employer] is NOT to be used for urgent needs. For medical emergencies, dial 911. Now available from your iPhone and Android! Providers Seen During Your Hospitalization Provider Specialty Primary office phone Melody Hernandez MD Urology [phone] Your Primary Care Physician (PCP) Primary Care Physician Office Phone Office Fax UNKNOWN, PROVIDER ** None ** ** None ** You are allergic to the following Allergen Reactions Niacin Other (comments) Increases blood pressure Penicillins Unable to Obtain Recent Documentation Height Weight BMI Smoking Status 1.778 m 112.9 kg 35.73 kg/m2 Never Smoker Emergency Contacts Name Discharge Info Relation Home Work Mobile [address] [facility] CAREGIVER [3] Spouse [3]   [phone] Jay Stevens  Spouse [3] [phone] Patient Belongings The following personal items are in your possession at time of discharge: 
  Dental Appliances: None  Visual Aid: None   Hearing Aids/Status: At home  Home Medications: None   Jewelry: None  Clothing: Sweater, Footwear, Shorts, Undergarments, Pants, Jacket/Coat    Other Valuables: None Discharge Instructions Attachments/References NEPHRECTOMY: LAPAROSCOPIC: POST-OP (ENGLISH) Patient Handouts Laparoscopic Nephrectomy: What to Expect at [facility] Your Recovery A nephrectomy is surgery to take out part or all of the kidney. One or both kidneys may be taken out. Sometimes other tissue near the kidney is taken out at the same time. Your belly will feel sore after the surgery. This usually lasts about 1 to 2 weeks. Your doctor will give you pain medicine for this. You may also have other symptoms such as nausea, diarrhea, constipation, gas, or a headache. At first, you may have low energy and get tired quickly. It may take 3 to 6 months for your energy to fully return. Your body can work fine with one healthy kidney. If both kidneys are removed or your remaining kidney is not healthy, your doctor will talk to you about the kind of treatment you will need after surgery. This care sheet gives you a general idea about how long it will take for you to recover. But each person recovers at a different pace. Follow the steps below to get better as quickly as possible. How can you care for yourself at home? Activity ? · Rest when you feel tired. Getting enough sleep will help you recover. ? · Try to walk each day. Start by walking a little more than you did the day before. Bit by bit, increase the amount you walk. Walking boosts blood flow and helps prevent pneumonia and constipation. ? · Avoid exercises that use your belly muscles and strenuous activities such as bicycle riding, jogging, weight lifting, or aerobic exercise until your doctor says it is okay. ? · For at least 4 weeks, avoid lifting anything that would make you strain. This may include a child, heavy grocery bags and milk containers, a heavy briefcase or backpack, cat litter or dog food bags, or a vacuum . ? · Hold a pillow over the cuts the doctor made (incisions) when you cough or take deep breaths. This will support your belly and decrease your pain. ? · Do breathing exercises at home as instructed by your doctor. This will help prevent pneumonia. ? · Ask your doctor when you can drive again. ? · You will probably need to take 4 to 6 weeks off from work. It depends on the type of work you do and how you feel. ? · You may be able to take showers (unless you have a drainage tube near your incisions). If you have a drainage tube, follow your doctor's instructions to empty and care for it. Do not take a bath for the first 2 weeks, or until your doctor tells you it is okay. ? · Ask your doctor when it is okay for you to have sex. Diet ? · You can eat your normal diet. If you were on a special diet for your kidneys before surgery, follow that diet until your doctor tells you to stop. ? · If your stomach is upset, try bland, low-fat foods like plain rice, broiled chicken, toast, and yogurt. ? · Drink plenty of fluids (unless your doctor tells you not to). ? · You may notice that your bowel movements are not regular right after your surgery. This is common.  Try to avoid constipation and straining with bowel movements. You may want to take a fiber supplement every day. If you have not had a bowel movement after a couple of days, ask your doctor about taking a mild laxative. Medicines ? · Your doctor will tell you if and when you can restart your medicines. He or she will also give you instructions about taking any new medicines. ? · If you take blood thinners, such as warfarin (Coumadin), clopidogrel (Plavix), or aspirin, be sure to talk to your doctor. He or she will tell you if and when to start taking those medicines again. Make sure that you understand exactly what your doctor wants you to do. ? · Take pain medicines exactly as directed. ¨ If the doctor gave you a prescription medicine for pain, take it as prescribed. ¨ If you are not taking a prescription pain medicine, take an over-the-counter medicine that your doctor recommends. Read and follow all instructions on the label. ¨ Do not take aspirin, ibuprofen (Advil, Motrin), or naproxen (Aleve), or other nonsteroidal anti-inflammatory drugs (NSAIDs) unless your doctor says it is okay. ? · If you think your pain medicine is making you sick to your stomach: 
¨ Take your medicine after meals (unless your doctor has told you not to). ¨ Ask your doctor for a different pain medicine. ? · If your doctor prescribed antibiotics, take them as directed. Do not stop taking them just because you feel better. You need to take the full course of antibiotics. Incision care ? · If you have strips of tape on the incisions, leave the tape on for a week or until it falls off.  
? · Wash the area around the incisions daily with warm, soapy water and pat it dry. Don't use hydrogen peroxide or alcohol, which can slow healing. You may cover the incisions with gauze bandages if they weep or rub against clothing. Change the bandages every day. ? · Keep the area around the incisions clean and dry. Follow-up care is a key part of your treatment and safety. Be sure to make and go to all appointments, and call your doctor if you are having problems. It's also a good idea to know your test results and keep a list of the medicines you take. When should you call for help? Call 911 anytime you think you may need emergency care. For example, call if: 
? · You passed out (lost consciousness). ? · You have chest pain, are short of breath, or cough up blood. ?Call your doctor now or seek immediate medical care if: 
? · You have pain that does not get better after you take your pain medicine. ? · You have symptoms of a urinary tract infection. These may include: 
¨ Pain or burning when you urinate. ¨ A frequent need to urinate without being able to pass much urine. ¨ Pain in the flank, which is just below the rib cage and above the waist on either side of the back. ¨ Blood in the urine. ¨ A fever. ? · You have signs of infection, such as: 
¨ Increased pain, swelling, warmth, or redness. ¨ Red streaks leading from the incisions. ¨ Pus draining from the incisions. ¨ A fever. ? · You have loose stitches, or your incisions come open. ? · You are bleeding from the incisions. ? · You cannot urinate. ? · You are sick to your stomach or cannot drink fluids. ? · You have signs of a blood clot in your leg (called a deep vein thrombosis), such as: 
¨ Pain in the calf, back of the knee, thigh, or groin. ¨ Redness and swelling in your leg. ? Watch closely for changes in your health, and be sure to contact your doctor if you are having any problems. Where can you learn more? Go to http://ronnie-jaylene.info/. Enter [id number] in the search box to learn more about \"Laparoscopic Nephrectomy: What to Expect at Home. \" Current as of: May 12, 2017 Content Version: 11.4 © 9857-8403 Healthwise, Incorporated.  Care instructions adapted under license by [address] (which disclaims liability or warranty for this information). If you have questions about a medical condition or this instruction, always ask your healthcare professional. [address] any warranty or liability for your use of this information. Please provide this summary of care documentation to your next provider. Signatures-by signing, you are acknowledging that this After Visit Summary has been reviewed with you and you have received a copy. Patient Signature:  ____________________________________________________________ Date:  ____________________________________________________________  
  
Irasmea Carrillo Provider Signature:  ____________________________________________________________ Date:  ____________________________________________________________

## 2018-01-04 NOTE — PERIOP NOTES
TRANSFER - OUT REPORT:    Verbal report given to mario rn(name) on Nathalie Byrd  being transferred to 2215(unit) for routine post - op       Report consisted of patients Situation, Background, Assessment and   Recommendations(SBAR). Information from the following report(s) SBAR, OR Summary, Intake/Output and MAR was reviewed with the receiving nurse. Lines:   Peripheral IV 01/04/18 Right Hand (Active)        Opportunity for questions and clarification was provided.       Patient transported with:   Registered Nurse

## 2018-01-05 LAB
ANION GAP SERPL CALC-SCNC: 8 MMOL/L (ref 3–18)
BUN SERPL-MCNC: 25 MG/DL (ref 7–18)
BUN/CREAT SERPL: 15 (ref 12–20)
CALCIUM SERPL-MCNC: 8.2 MG/DL (ref 8.5–10.1)
CHLORIDE SERPL-SCNC: 107 MMOL/L (ref 100–108)
CO2 SERPL-SCNC: 26 MMOL/L (ref 21–32)
CREAT SERPL-MCNC: 1.68 MG/DL (ref 0.6–1.3)
GLUCOSE SERPL-MCNC: 113 MG/DL (ref 74–99)
HCT VFR BLD AUTO: 43.7 % (ref 36–48)
HGB BLD-MCNC: 14.8 G/DL (ref 13–16)
PLATELET # BLD AUTO: 199 K/UL (ref 135–420)
POTASSIUM SERPL-SCNC: 4.5 MMOL/L (ref 3.5–5.5)
SODIUM SERPL-SCNC: 141 MMOL/L (ref 136–145)

## 2018-01-05 PROCEDURE — 36415 COLL VENOUS BLD VENIPUNCTURE: CPT

## 2018-01-05 PROCEDURE — 65270000029 HC RM PRIVATE

## 2018-01-05 PROCEDURE — 85018 HEMOGLOBIN: CPT

## 2018-01-05 PROCEDURE — 74011250636 HC RX REV CODE- 250/636: Performed by: UROLOGY

## 2018-01-05 PROCEDURE — 80048 BASIC METABOLIC PNL TOTAL CA: CPT

## 2018-01-05 PROCEDURE — 85049 AUTOMATED PLATELET COUNT: CPT | Performed by: UROLOGY

## 2018-01-05 PROCEDURE — 77030020255 HC SOL INJ LR 1000ML BG

## 2018-01-05 PROCEDURE — 74011250637 HC RX REV CODE- 250/637: Performed by: UROLOGY

## 2018-01-05 RX ADMIN — DOCUSATE SODIUM 100 MG: 100 CAPSULE, LIQUID FILLED ORAL at 09:22

## 2018-01-05 RX ADMIN — AMLODIPINE BESYLATE 5 MG: 5 TABLET ORAL at 09:22

## 2018-01-05 RX ADMIN — TAMSULOSIN HYDROCHLORIDE 0.4 MG: 0.4 CAPSULE ORAL at 19:02

## 2018-01-05 RX ADMIN — DOCUSATE SODIUM 100 MG: 100 CAPSULE, LIQUID FILLED ORAL at 19:02

## 2018-01-05 RX ADMIN — OXYCODONE HYDROCHLORIDE AND ACETAMINOPHEN 2 TABLET: 5; 325 TABLET ORAL at 11:51

## 2018-01-05 RX ADMIN — HEPARIN SODIUM 5000 UNITS: 5000 INJECTION, SOLUTION INTRAVENOUS; SUBCUTANEOUS at 11:51

## 2018-01-05 RX ADMIN — Medication 10 ML: at 21:26

## 2018-01-05 RX ADMIN — OXYCODONE HYDROCHLORIDE AND ACETAMINOPHEN 2 TABLET: 5; 325 TABLET ORAL at 05:09

## 2018-01-05 RX ADMIN — CIPROFLOXACIN 400 MG: 2 INJECTION, SOLUTION INTRAVENOUS at 21:25

## 2018-01-05 RX ADMIN — Medication 10 ML: at 19:03

## 2018-01-05 RX ADMIN — HYDROMORPHONE HYDROCHLORIDE 1 MG: 2 INJECTION INTRAMUSCULAR; INTRAVENOUS; SUBCUTANEOUS at 07:51

## 2018-01-05 RX ADMIN — OXYBUTYNIN CHLORIDE 5 MG: 5 TABLET ORAL at 11:51

## 2018-01-05 RX ADMIN — OXYCODONE HYDROCHLORIDE AND ACETAMINOPHEN 2 TABLET: 5; 325 TABLET ORAL at 23:00

## 2018-01-05 RX ADMIN — HEPARIN SODIUM 5000 UNITS: 5000 INJECTION, SOLUTION INTRAVENOUS; SUBCUTANEOUS at 02:36

## 2018-01-05 RX ADMIN — OXYCODONE HYDROCHLORIDE AND ACETAMINOPHEN 2 TABLET: 5; 325 TABLET ORAL at 19:02

## 2018-01-05 RX ADMIN — HEPARIN SODIUM 5000 UNITS: 5000 INJECTION, SOLUTION INTRAVENOUS; SUBCUTANEOUS at 19:02

## 2018-01-05 RX ADMIN — CIPROFLOXACIN 400 MG: 2 INJECTION, SOLUTION INTRAVENOUS at 09:22

## 2018-01-05 NOTE — PROGRESS NOTES
Assisted OOB to BR to void, tolerated well. Preferred to stay ambulatory, walked in patiño with friend.

## 2018-01-05 NOTE — ROUTINE PROCESS
1951-[facility]. 2102-The patient is alert and oriented x 4. There is no apparent signs of distress. The patient is in stable condition. VSS. The patient has no complaints. Abdomen distended. Lap sites are clean, dry and intact. Hypoactive bowel sounds. Gu draining yellow urine. Family at the bedside. 2300-Patient sleeping. 0012-The patient is resting.    0200-Sleeping.    0501-Resting.    0602-Sleeping.

## 2018-01-05 NOTE — PROGRESS NOTES
Progress Note    Patient: Nathalie Byrd MRN: [id number]  SSN: [id number]    YOB: 1950  Age: 74 y.o. Sex: male      Admit Date: 1/4/2018    LOS: 1 day     Subjective:     No events, some abd pain overnight, no n, v.      Objective:     Vitals:    01/04/18 1555 01/04/18 1941 01/04/18 2324 01/05/18 0510   BP:  119/68 113/70 130/75   Pulse: 100 100 (!) 113 100   Resp: 16 15 20 20   Temp:  98.2 °F (36.8 °C) 98.1 °F (36.7 °C) 98.6 °F (37 °C)   SpO2: 94% 93% 93% 94%   Weight:       Height:            Intake and Output:  Current Shift:    Last three shifts: 01/03 1901 - 01/05 0700  In: 6975 [P.O.:240; I.V.:2600]  Out: 5390 [Urine:1425]    Physical Exam:   GENERAL: alert, cooperative, no distress, appears stated age  LUNG: unlabored  HEART: regular rate and rhythm  ABDOMEN: Soft, Non tender  EXTREMITIES:  extremities normal, atraumatic, no cyanosis or edema  SKIN: Normal.  PSYCHIATRIC: non focal  INCISION: clean, dry, intact    Lab/Data Review:    Lab Results   Component Value Date/Time    Hemoglobin, POC 15.3 01/04/2018 08:37 AM    HGB 14.8 01/05/2018 01:50 AM    Hematocrit, POC 45 01/04/2018 08:37 AM    HCT 43.7 01/05/2018 01:50 AM    PLATELET 641 01/76/1139 01:56 AM       Lab Results   Component Value Date/Time    Sodium 141 01/05/2018 01:50 AM    Potassium 4.5 01/05/2018 01:50 AM    Chloride 107 01/05/2018 01:50 AM    CO2 26 01/05/2018 01:50 AM    Anion gap 8 01/05/2018 01:50 AM    Glucose 113 01/05/2018 01:50 AM    BUN 25 01/05/2018 01:50 AM    Creatinine 1.68 01/05/2018 01:50 AM    BUN/Creatinine ratio 15 01/05/2018 01:50 AM    GFR est AA 50 01/05/2018 01:50 AM    GFR est non-AA 41 01/05/2018 01:50 AM    Calcium 8.2 01/05/2018 01:50 AM         POD 1 s/p R RAL Nx    Assessment:     Active Problems:    Renal mass (1/4/2018)        Plan:      Gu out today  Abx x 24 hours  UOOB  Heparin  Reg diet   Hopefully home tomorrow    Signed By: Marcio Gross MD     January 5, 2018

## 2018-01-05 NOTE — ROUTINE PROCESS
Bedside and Verbal shift change report given to Suzy Mcdermott RN (oncoming nurse) by Florence Hernandez RN (offgoing nurse). Report included the following information SBAR, Kardex and MAR.

## 2018-01-05 NOTE — PROGRESS NOTES
Problem: Falls - Risk of  Goal: *Absence of Falls  Document [place] Fall Risk and appropriate interventions in the flowsheet.    Outcome: Progressing Towards Goal  Fall Risk Interventions:  Mobility Interventions: Patient to call before getting OOB    Mentation Interventions: Room close to nurse's station    Medication Interventions: Patient to call before getting OOB    Elimination Interventions: Call light in reach    History of Falls Interventions: Door open when patient unattended

## 2018-01-06 LAB
ANION GAP SERPL CALC-SCNC: 8 MMOL/L (ref 3–18)
BUN SERPL-MCNC: 21 MG/DL (ref 7–18)
BUN/CREAT SERPL: 13 (ref 12–20)
CALCIUM SERPL-MCNC: 9.1 MG/DL (ref 8.5–10.1)
CHLORIDE SERPL-SCNC: 105 MMOL/L (ref 100–108)
CO2 SERPL-SCNC: 28 MMOL/L (ref 21–32)
CREAT SERPL-MCNC: 1.66 MG/DL (ref 0.6–1.3)
ERYTHROCYTE [DISTWIDTH] IN BLOOD BY AUTOMATED COUNT: 13.5 % (ref 11.6–14.5)
GLUCOSE SERPL-MCNC: 114 MG/DL (ref 74–99)
HCT VFR BLD AUTO: 44.4 % (ref 36–48)
HGB BLD-MCNC: 14.8 G/DL (ref 13–16)
MCH RBC QN AUTO: 29.4 PG (ref 24–34)
MCHC RBC AUTO-ENTMCNC: 33.3 G/DL (ref 31–37)
MCV RBC AUTO: 88.3 FL (ref 74–97)
PLATELET # BLD AUTO: 206 K/UL (ref 135–420)
PMV BLD AUTO: 9 FL (ref 9.2–11.8)
POTASSIUM SERPL-SCNC: 4 MMOL/L (ref 3.5–5.5)
RBC # BLD AUTO: 5.03 M/UL (ref 4.7–5.5)
SODIUM SERPL-SCNC: 141 MMOL/L (ref 136–145)
WBC # BLD AUTO: 11.6 K/UL (ref 4.6–13.2)

## 2018-01-06 PROCEDURE — 74011250637 HC RX REV CODE- 250/637: Performed by: UROLOGY

## 2018-01-06 PROCEDURE — 85027 COMPLETE CBC AUTOMATED: CPT | Performed by: UROLOGY

## 2018-01-06 PROCEDURE — 36415 COLL VENOUS BLD VENIPUNCTURE: CPT | Performed by: UROLOGY

## 2018-01-06 PROCEDURE — 80048 BASIC METABOLIC PNL TOTAL CA: CPT | Performed by: UROLOGY

## 2018-01-06 PROCEDURE — 65270000029 HC RM PRIVATE

## 2018-01-06 PROCEDURE — 74011250636 HC RX REV CODE- 250/636: Performed by: UROLOGY

## 2018-01-06 RX ORDER — ONDANSETRON 4 MG/1
4 TABLET, ORALLY DISINTEGRATING ORAL
Status: DISCONTINUED | OUTPATIENT
Start: 2018-01-06 | End: 2018-01-07 | Stop reason: HOSPADM

## 2018-01-06 RX ADMIN — HEPARIN SODIUM 5000 UNITS: 5000 INJECTION, SOLUTION INTRAVENOUS; SUBCUTANEOUS at 21:59

## 2018-01-06 RX ADMIN — DOCUSATE SODIUM 100 MG: 100 CAPSULE, LIQUID FILLED ORAL at 17:58

## 2018-01-06 RX ADMIN — TAMSULOSIN HYDROCHLORIDE 0.4 MG: 0.4 CAPSULE ORAL at 17:58

## 2018-01-06 RX ADMIN — AMLODIPINE BESYLATE 5 MG: 5 TABLET ORAL at 09:21

## 2018-01-06 RX ADMIN — CIPROFLOXACIN 400 MG: 2 INJECTION, SOLUTION INTRAVENOUS at 13:10

## 2018-01-06 RX ADMIN — HEPARIN SODIUM 5000 UNITS: 5000 INJECTION, SOLUTION INTRAVENOUS; SUBCUTANEOUS at 03:26

## 2018-01-06 RX ADMIN — DOCUSATE SODIUM 100 MG: 100 CAPSULE, LIQUID FILLED ORAL at 09:21

## 2018-01-06 RX ADMIN — OXYCODONE HYDROCHLORIDE AND ACETAMINOPHEN 2 TABLET: 5; 325 TABLET ORAL at 03:26

## 2018-01-06 RX ADMIN — HEPARIN SODIUM 5000 UNITS: 5000 INJECTION, SOLUTION INTRAVENOUS; SUBCUTANEOUS at 13:10

## 2018-01-06 RX ADMIN — OXYCODONE HYDROCHLORIDE AND ACETAMINOPHEN 1 TABLET: 5; 325 TABLET ORAL at 09:21

## 2018-01-06 NOTE — PROGRESS NOTES
2000: Assumed pt care. Received pt sitting on the chair, pt is alert and oriented x 4. No signs of distress. With wife at bedside massaging pt's right shoulder. 1-6-2018    0725: Bedside and Verbal shift change report given to Gali Montes (oncoming nurse) by Katia An   (offgoing nurse). Report included the following information SBAR, Kardex, Intake/Output and MAR.

## 2018-01-06 NOTE — PROGRESS NOTES
Received bedside shift report from Reid Calderon RN. Pt is resting in bed, CNA at bedside, visitor at bedside. White board updated, call bell within reach. 0755 Pt stating bed not working, bed working when in room. Pt sitting in chair, complaining of pain and nausea. Will administer medications shortly. 7038 While administered IV zofran, pt's IV stopped working. Received verbal orders for zofran ODT 4mg PO Q6H for nausea. Dr. Sonny Gandara prefers IV zofran, but pt is uncomfortable and would like oral. Will start new IV. Messaged pharmacy for cipro dose. Messaged back that it will be delivered shortly. 0840 Pt has new IV, administered IV zofran, pt returned to bed, will return in a little while with pain medications. Pt agrees to plan.     0921 Pt states nausea is better. 1021 Pt asleep in bed, wife at bedside, NAD noted. [address] to locate the cipro. Will  shortly, as it was not delivered. 1327 Pt ambulating in hallway, NAD noted. 46 Pt ambulating in hallway    [id number] Pt asleep in bed, wife at bedside. NAD noted. 1822 Pt ambulating in hallway, NAD noted. Bedside shift change report given to Delgado Ponce RN (oncoming nurse) by Duncan Pena RN (offgoing nurse). Report included the following information SBAR, Kardex, OR Summary, Intake/Output, MAR and Recent Results.

## 2018-01-06 NOTE — PROGRESS NOTES
[facility]   Discharge Planning/ Assessment    Reasons for Intervention:  Interviewed patient. Verified demographics listed on face sheet with patient; all information correct. Patient stated their PCP is Dr. Malini Padilla, seen within the last week . Patient lives with his wife, who is also his NOK. Patient independent with ADLs prior to admission. No use of DME prior to admissione. Discharge plan is home.      High Risk Criteria  [] Yes  [x]No   Physician Referral  [] Yes  [x]No        Date    Nursing Referral  [] Yes  [x]No        Date    Patient/Family Request  [] Yes  [x]No        Date       Resources:    Medicare  [x] Yes  []No   Medicaid  [] Yes  []No   No Resources  [] Yes  []No   Private Insurance  [] Yes  []No    Name/Phone Number    Other  [] Yes  []No        (i.e. Workman's Comp)         Prior Services:    Prior Services  [] Yes  [x]No   Home Health  [] Yes  []No   [address] [place]  [] Yes  []No        Number of [address]  [] Yes  []No       Meals on Wheels  [] Yes  []No   Office on Aging  [] Yes  []No   Transportation Services  [] Yes  []No   Nursing Home  [] Yes  []No        Nursing Home Name    [address]  [] Yes  []No        [address] Name    Other       Information Source:      Information obtained from  [x] Patient  [] Parent   [] [address]  [] Child  [] Spouse   [] Significant Other/Partner   [] Friend      [] EMS    [] Nursing Home Chart          [] Other:   Chart Review  [x] Yes  []No     Family/Support System:    Patient lives with  [] Alone    [x] Spouse   [] Significant Other  [] Children  [] Caretaker   [] Parent  [] Sibling     [] Other       Other Support System:    Is the patient responsible for care of others  [] Yes  [x]No   Information of person caring for patient on  discharge    Managers financial affairs independently  [] Yes  [x]No   If no, explain:      Status Prior to Admission:    Mental Status  [x] Awake  [x] Alert  [x] Oriented  [] Quiet/Calm [] Lethargic/Sedated   [] Disoriented  [] Restless/Anxious  [] Combative   Personal Care  [] Dependent  [x] Independent Personal Care  [] Requires Assistance   Meal Preparation Ability  [x] Independent   [] Standby Assistance   [] Minimal Assistance   [] Moderate Assistance  [] Maximum Assistance     [] Total Assistance   Chores  [x] Independent with Chores   [] N/A Nursing Home Resident   [] Requires Assistance   Bowel/Bladder  [x] Continent  [] Catheter  [] Incontinent  [] Ostomy Self-Care    [] Urine Diversion Self-Care  [] Maximum Assistance     [] Total Assistance   Number of Persons needed for assistance    DME at home  [] [employer], Adrianna Darting  [] [employer], Straight   [] Commode    [] Bathroom/Grab Bars  [] Hospital Bed  [] Nebulizer  [] Oxygen           [] Raised Toilet Seat  [] Shower Chair  [] Side Rails for Bed   [] Tub Transfer Bench   [] Siva Brian  [] Bud Craft      [] Other:   Vendor      Treatment Presently Receiving:    Current Treatments  [] Chemotherapy  [] Dialysis  [] Insulin  [] IVAB [] IVF   [] O2  [] PCA   [] PT   [] RT   [] Tube Feedings   [] Wound Care     Psychosocial Evaluation:    Verbalized Knowledge of Disease Process  [x] Patient  []Family   Coping with Disease Process  [x] Patient  []Family   Requires Further Counseling Coping with Disease Process  [] Patient  []Family     Identified Projected Needs:    Home Health Aid  [] Yes  []No   Transportation  [] Yes  []No   Education  [] Yes  []No        Specific Education     Financial Counseling  [] Yes  []No   Inability to Care for Self/Will Require 24 hour care  [] Yes  []No   Pain Management  [] Yes  []No   Home Infusion Therapy  [] Yes  []No   Oxygen Therapy  [] Yes  []No   DME  [] Yes  []No   Long Term Care Placement  [] Yes  []No   Rehab  [] Yes  []No   Physical Therapy  [] Yes  []No   Needs Anticipated At This Time  [] Yes  []No     Intra-Hospital Referral:    [address] [facility]  [] Yes []No     [] Yes  []No   Patient Representative  [] Yes  []No   Staff for Teaching Needs  [] Yes  []No   Specialty Teaching Needs     Diabetic Educator  [] Yes  []No   Referral for Diabetic Educator Needed  [] Yes  []No  If Yes, place order for Nutritionist or Diabetic Consult     Tentative Discharge Plan:    Home with No Services  [x] Yes  []No   Home with [address]  [] Yes  []No        If Yes, specify type    Home Care Program  [] Yes  []No        If Yes, specify type    Meals on Wheels  [] Yes  []No   Office of Aging  [] Yes  []No   NHP  [] Yes  []No   Return to the Nursing Home  [] Yes  []No   Rehab Therapy  [] Yes  []No   Acute Rehab  [] Yes  []No   Subacute Rehab  [] Yes  []No   Private Care  [] Yes  []No   Substance Abuse Referral  [] Yes  []No   Transportation  [] Yes  []No   Chore Service  [] Yes  []No   Inpatient Hospice  [] Yes  []No   OP RT  [] Yes  [] No   OP Hemo  [] Yes  [] No   OP PT  [] Yes  []No   Support Group  [] Yes  []No   Reach to Recovery  [] Yes  []No   OP Oncology Clinic  [] Yes  []No   Clinic Appointment  [] Yes  []No   DME  [] Yes  []No   Comments    Name of D/C Planner or  Given to Patient or Family Marlyn Robin RN BSN   Phone Number         Extension    Date    Time    If you are discharged home, whom do you designate to participate in your discharge plan and receive any information needed?      Enter name of [place]        Phone # of jones [phone]        Address of jones         Updated         Patient refused to designate any           individual

## 2018-01-06 NOTE — PROGRESS NOTES
Progress Note    Patient: Kip Rossi MRN: [id number]  SSN: [id number]    YOB: 1950  Age: 74 y.o.   Sex: male      Admit Date: 1/4/2018    LOS: 2 days     Subjective:     No events, some dizziness with standing, some nausea, no vomiting, minimal flatus, voiding well    Objective:     Vitals:    01/05/18 0510 01/05/18 0843 01/06/18 0122 01/06/18 0725   BP: 130/75 125/75 135/86 135/90   Pulse: 100 69 89 99   Resp: 20 18 18    Temp: 98.6 °F (37 °C) 98.3 °F (36.8 °C) 98.7 °F (37.1 °C) 98.2 °F (36.8 °C)   SpO2: 94% 95% 95% 92%   Weight:       Height:            Intake and Output:  Current Shift:    Last three shifts: 01/04 1901 - 01/06 0700  In: 1280 [P.O.:1080; I.V.:200]  Out: 3675 [Urine:3675]    Physical Exam:   GENERAL: alert, cooperative, no distress, appears stated age  LUNG: unlabored  HEART: regular rate and rhythm  ABDOMEN: Soft, Non tender  EXTREMITIES:  extremities normal, atraumatic, no cyanosis or edema  SKIN: Normal.  PSYCHIATRIC: non focal  INCISION: clean, dry, intact    Lab/Data Review:    Lab Results   Component Value Date/Time    WBC 11.6 01/06/2018 04:29 AM    Hemoglobin, POC 15.3 01/04/2018 08:37 AM    HGB 14.8 01/06/2018 04:29 AM    Hematocrit, POC 45 01/04/2018 08:37 AM    HCT 44.4 01/06/2018 04:29 AM    PLATELET 879 58/26/7188 04:29 AM    MCV 88.3 01/06/2018 04:29 AM       Lab Results   Component Value Date/Time    Sodium 141 01/06/2018 04:29 AM    Potassium 4.0 01/06/2018 04:29 AM    Chloride 105 01/06/2018 04:29 AM    CO2 28 01/06/2018 04:29 AM    Anion gap 8 01/06/2018 04:29 AM    Glucose 114 01/06/2018 04:29 AM    BUN 21 01/06/2018 04:29 AM    Creatinine 1.66 01/06/2018 04:29 AM    BUN/Creatinine ratio 13 01/06/2018 04:29 AM    GFR est AA 51 01/06/2018 04:29 AM    GFR est non-AA 42 01/06/2018 04:29 AM    Calcium 9.1 01/06/2018 04:29 AM         POD 2 s/p R RAL Nx    Assessment:     Active Problems:    Renal mass (1/4/2018)        Plan:       Labs stable  UOOB  Heparin  Reg diet   Will keep in house until return of bowel function    Signed By: Elliot Carlin MD     January 6, 2018

## 2018-01-06 NOTE — PROGRESS NOTES
Problem: Falls - Risk of  Goal: *Absence of Falls  Document [place] Fall Risk and appropriate interventions in the flowsheet.    Outcome: Progressing Towards Goal  Fall Risk Interventions:  Mobility Interventions: Assess mobility with egress test, Patient to call before getting OOB    Mentation Interventions: Adequate sleep, hydration, pain control, Door open when patient unattended, Evaluate medications/consider consulting pharmacy    Medication Interventions: Evaluate medications/consider consulting pharmacy, Patient to call before getting OOB, Teach patient to arise slowly    Elimination Interventions: Call light in reach, Patient to call for help with toileting needs, Toileting schedule/hourly rounds    History of Falls Interventions: Door open when patient unattended, Evaluate medications/consider consulting pharmacy

## 2018-01-06 NOTE — PROGRESS NOTES
Problem: Falls - Risk of  Goal: *Absence of Falls  Document [place] Fall Risk and appropriate interventions in the flowsheet.    Outcome: Progressing Towards Goal  Fall Risk Interventions:  Mobility Interventions: Assess mobility with egress test, Patient to call before getting OOB    Mentation Interventions: Adequate sleep, hydration, pain control, Door open when patient unattended, Evaluate medications/consider consulting pharmacy    Medication Interventions: Patient to call before getting OOB, Teach patient to arise slowly    Elimination Interventions: Call light in reach, Patient to call for help with toileting needs, Toileting schedule/hourly rounds    History of Falls Interventions: Door open when patient unattended, Evaluate medications/consider consulting pharmacy

## 2018-01-06 NOTE — ACP (ADVANCE CARE PLANNING)
Patient has designated his wife, Patricia Santiago, to participate in his/her discharge plan and to receive any needed information.      Name: Delmis Gilliland  Address:  Phone number: [phone]

## 2018-01-07 VITALS
WEIGHT: 249 LBS | BODY MASS INDEX: 35.65 KG/M2 | SYSTOLIC BLOOD PRESSURE: 132 MMHG | DIASTOLIC BLOOD PRESSURE: 90 MMHG | RESPIRATION RATE: 14 BRPM | HEART RATE: 101 BPM | TEMPERATURE: 98.1 F | OXYGEN SATURATION: 94 % | HEIGHT: 70 IN

## 2018-01-07 LAB
ANION GAP SERPL CALC-SCNC: 9 MMOL/L (ref 3–18)
BUN SERPL-MCNC: 22 MG/DL (ref 7–18)
BUN/CREAT SERPL: 13 (ref 12–20)
CALCIUM SERPL-MCNC: 9 MG/DL (ref 8.5–10.1)
CHLORIDE SERPL-SCNC: 103 MMOL/L (ref 100–108)
CO2 SERPL-SCNC: 28 MMOL/L (ref 21–32)
CREAT SERPL-MCNC: 1.72 MG/DL (ref 0.6–1.3)
ERYTHROCYTE [DISTWIDTH] IN BLOOD BY AUTOMATED COUNT: 13.5 % (ref 11.6–14.5)
GLUCOSE SERPL-MCNC: 105 MG/DL (ref 74–99)
HCT VFR BLD AUTO: 46 % (ref 36–48)
HGB BLD-MCNC: 15.5 G/DL (ref 13–16)
MCH RBC QN AUTO: 29.7 PG (ref 24–34)
MCHC RBC AUTO-ENTMCNC: 33.7 G/DL (ref 31–37)
MCV RBC AUTO: 88.1 FL (ref 74–97)
PLATELET # BLD AUTO: 235 K/UL (ref 135–420)
PMV BLD AUTO: 9.4 FL (ref 9.2–11.8)
POTASSIUM SERPL-SCNC: 4 MMOL/L (ref 3.5–5.5)
RBC # BLD AUTO: 5.22 M/UL (ref 4.7–5.5)
SODIUM SERPL-SCNC: 140 MMOL/L (ref 136–145)
WBC # BLD AUTO: 12.3 K/UL (ref 4.6–13.2)

## 2018-01-07 PROCEDURE — 80048 BASIC METABOLIC PNL TOTAL CA: CPT | Performed by: UROLOGY

## 2018-01-07 PROCEDURE — 74011250636 HC RX REV CODE- 250/636: Performed by: UROLOGY

## 2018-01-07 PROCEDURE — 74011250637 HC RX REV CODE- 250/637: Performed by: UROLOGY

## 2018-01-07 PROCEDURE — 36415 COLL VENOUS BLD VENIPUNCTURE: CPT | Performed by: UROLOGY

## 2018-01-07 PROCEDURE — 85027 COMPLETE CBC AUTOMATED: CPT | Performed by: UROLOGY

## 2018-01-07 RX ORDER — CIPROFLOXACIN 500 MG/1
500 TABLET ORAL EVERY 12 HOURS
Status: DISCONTINUED | OUTPATIENT
Start: 2018-01-07 | End: 2018-01-07 | Stop reason: HOSPADM

## 2018-01-07 RX ADMIN — HEPARIN SODIUM 5000 UNITS: 5000 INJECTION, SOLUTION INTRAVENOUS; SUBCUTANEOUS at 05:47

## 2018-01-07 RX ADMIN — CIPROFLOXACIN HYDROCHLORIDE 500 MG: 500 TABLET, FILM COATED ORAL at 12:57

## 2018-01-07 RX ADMIN — HEPARIN SODIUM 5000 UNITS: 5000 INJECTION, SOLUTION INTRAVENOUS; SUBCUTANEOUS at 12:57

## 2018-01-07 RX ADMIN — AMLODIPINE BESYLATE 5 MG: 5 TABLET ORAL at 09:55

## 2018-01-07 RX ADMIN — CIPROFLOXACIN 400 MG: 2 INJECTION, SOLUTION INTRAVENOUS at 00:51

## 2018-01-07 RX ADMIN — ONDANSETRON 4 MG: 2 INJECTION INTRAMUSCULAR; INTRAVENOUS at 08:25

## 2018-01-07 RX ADMIN — OXYCODONE HYDROCHLORIDE AND ACETAMINOPHEN 1 TABLET: 5; 325 TABLET ORAL at 00:52

## 2018-01-07 RX ADMIN — DOCUSATE SODIUM 100 MG: 100 CAPSULE, LIQUID FILLED ORAL at 09:55

## 2018-01-07 NOTE — DISCHARGE SUMMARY
Discharge Summary    Patient: Tila Corea               Sex: male          DOA: 1/4/2018         YOB: 1950      Age:  74 y.o.        LOS:  LOS: 3 days                Admit Date: 1/4/2018    Discharge Date: 1/7/2018    Admission Diagnoses: renal mass right bilateral kidney stones n28.89 n20. 0; Renal*    Discharge Diagnoses:    Problem List as of 1/7/2018  Date Reviewed: 12/6/2017          Codes Class Noted - Resolved    Renal mass ICD-10-CM: N28.89  ICD-9-CM: 593.9  1/4/2018 - Present              Discharge Condition: Stable    Hospital Course: Unremarkable:  Advanced to reg diet, jacobs removed and po pain control. See chart for further detials    Consults: None    Significant Diagnostic Studies: None    Discharge Medications:     Current Discharge Medication List      START taking these medications    Details   oxyCODONE-acetaminophen (PERCOCET) 5-325 mg per tablet Take 1 Tab by mouth every four (4) hours as needed for Pain. Max Daily Amount: 6 Tabs. Qty: 30 Tab, Refills: 0      docusate sodium (COLACE) 100 mg capsule Take 1 Cap by mouth two (2) times a day for 90 days. Qty: 30 Cap, Refills: 1         CONTINUE these medications which have NOT CHANGED    Details   amLODIPine (NORVASC) 5 mg tablet 5 mg daily. calcipotriene-betamethasone (TACLONEX) 0.005-0.064 % topical ointment APPLY THIN LAYER TO PSORIASIS OF BODY AS NEEDED TWICE A DAY X 2-4 WEEKS  Refills: 2      ELIDEL 1 % topical cream APPLY TO FACE AS NEEDED FOR RASH. Refills: 2      tamsulosin (FLOMAX) 0.4 mg capsule Take 1 Cap by mouth daily (after dinner). Qty: 30 Cap, Refills: 12    Associated Diagnoses: BPH with obstruction/lower urinary tract symptoms; Slow urinary stream             Activity: As tolerated    Diet: Regular    Wound Care:  May shower    Follow-up: As scheduled    Call MD if fever >101.5, signs of infection, intractable pain, nausea, vomiting, significant bleeding, worsening shortness of breath or chest pain, painful leg swelling, or any questions or concerns. No heavy lifting >15 lbs for 4 weeks. No driving on narcotics. OK to shower 48 hours after surgery. No baths or submerging incisions for 4 weeks until incisions are completely healed.

## 2018-01-07 NOTE — PROGRESS NOTES
Problem: Falls - Risk of  Goal: *Absence of Falls  Document [place] Fall Risk and appropriate interventions in the flowsheet.    Outcome: Progressing Towards Goal  Fall Risk Interventions:  Mobility Interventions: Patient to call before getting OOB    Mentation Interventions: Adequate sleep, hydration, pain control    Medication Interventions: Patient to call before getting OOB    Elimination Interventions: Call light in reach, Urinal in reach    History of Falls Interventions: Door open when patient unattended

## 2018-01-07 NOTE — PROGRESS NOTES
Progress Note    Patient: Randall Hawthorne MRN: [id number]  SSN: [id number]    YOB: 1950  Age: 74 y.o.   Sex: male      Admit Date: 1/4/2018    LOS: 3 days     Subjective:     No events, some nausea this am.  No voimiting, pain controled    Objective:     Vitals:    01/06/18 1551 01/06/18 1947 01/07/18 0002 01/07/18 0449   BP: 139/83 134/84 146/87 158/85   Pulse: 89 91 72 91   Resp: 18 16 15 14   Temp: 98.1 °F (36.7 °C) 98.2 °F (36.8 °C) 98.5 °F (36.9 °C) 98.1 °F (36.7 °C)   SpO2: 91% 91% 91% 92%   Weight:       Height:            Intake and Output:  Current Shift:    Last three shifts: 01/05 1901 - 01/07 0700  In: 1480 [P.O.:1080; I.V.:400]  Out: 3175 [Urine:3175]    Physical Exam:   GENERAL: alert, cooperative, no distress, appears stated age  LUNG: unlabored  HEART: regular rate and rhythm  ABDOMEN: Soft, Non tender  EXTREMITIES:  extremities normal, atraumatic, no cyanosis or edema  SKIN: Normal.  PSYCHIATRIC: non focal  INCISION: clean, dry, intact    Lab/Data Review:    Lab Results   Component Value Date/Time    WBC 11.6 01/06/2018 04:29 AM    Hemoglobin, POC 15.3 01/04/2018 08:37 AM    HGB 14.8 01/06/2018 04:29 AM    Hematocrit, POC 45 01/04/2018 08:37 AM    HCT 44.4 01/06/2018 04:29 AM    PLATELET 473 78/82/2545 04:29 AM    MCV 88.3 01/06/2018 04:29 AM       Lab Results   Component Value Date/Time    Sodium 141 01/06/2018 04:29 AM    Potassium 4.0 01/06/2018 04:29 AM    Chloride 105 01/06/2018 04:29 AM    CO2 28 01/06/2018 04:29 AM    Anion gap 8 01/06/2018 04:29 AM    Glucose 114 01/06/2018 04:29 AM    BUN 21 01/06/2018 04:29 AM    Creatinine 1.66 01/06/2018 04:29 AM    BUN/Creatinine ratio 13 01/06/2018 04:29 AM    GFR est AA 51 01/06/2018 04:29 AM    GFR est non-AA 42 01/06/2018 04:29 AM    Calcium 9.1 01/06/2018 04:29 AM         POD 3 s/p R RAL Nx    Assessment:     Active Problems:    Renal mass (1/4/2018)        Plan:       Labs pending  UOOB  Heparin  Reg diet   Possible dc today if tolerating po and labs stable      Signed By: Anmol Rogers MD     January 7, 2018

## 2018-01-07 NOTE — DISCHARGE INSTRUCTIONS
DISCHARGE SUMMARY from Nurse    PATIENT INSTRUCTIONS:    After general anesthesia or intravenous sedation, for 24 hours or while taking prescription Narcotics:  · Limit your activities  · Do not drive and operate hazardous machinery  · Do not make important personal or business decisions  · Do  not drink alcoholic beverages  · If you have not urinated within 8 hours after discharge, please contact your surgeon on call. Report the following to your surgeon:  · Excessive pain, swelling, redness or odor of or around the surgical area  · Temperature over 100.5  · Nausea and vomiting lasting longer than 4 hours or if unable to take medications  · Any signs of decreased circulation or nerve impairment to extremity: change in color, persistent  numbness, tingling, coldness or increase pain  · Any questions    What to do at Home:  Recommended activity: Activity as tolerated, Ambulate in house, No lifting, Driving, or Strenuous exercise until cleared by  [facility], No driving while on analgesics and See surgical instructions. If you experience any of the following symptoms fever greater than 100.4, nausea and vomiting lasting for more than 4 hours, signs of wound infections (i.e. Increased wound drainage, odor, redness, warmth, swelling), numbness/tingling in extremities, change in color in arms and legs, unable to urinate for 8 hours or more, or uncontrolled pain, please follow up with  [facility]. *  Please give a list of your current medications to your Primary Care Provider. *  Please update this list whenever your medications are discontinued, doses are      changed, or new medications (including over-the-counter products) are added. *  Please carry medication information at all times in case of emergency situations.     These are general instructions for a healthy lifestyle:    No smoking/ No tobacco products/ Avoid exposure to second hand smoke  Surgeon General's Warning:  Quitting smoking now greatly reduces serious risk to your health. Obesity, smoking, and sedentary lifestyle greatly increases your risk for illness    A healthy diet, regular physical exercise & weight monitoring are important for maintaining a healthy lifestyle    You may be retaining fluid if you have a history of heart failure or if you experience any of the following symptoms:  Weight gain of 3 pounds or more overnight or 5 pounds in a week, increased swelling in our hands or feet or shortness of breath while lying flat in bed. Please call your doctor as soon as you notice any of these symptoms; do not wait until your next office visit. Recognize signs and symptoms of STROKE:    F-face looks uneven    A-arms unable to move or move unevenly    S-speech slurred or non-existent    T-time-call 911 as soon as signs and symptoms begin-DO NOT go       Back to bed or wait to see if you get better-TIME IS BRAIN. Warning Signs of HEART ATTACK     Call 911 if you have these symptoms:   Chest discomfort. Most heart attacks involve discomfort in the center of the chest that lasts more than a few minutes, or that goes away and comes back. It can feel like uncomfortable pressure, squeezing, fullness, or pain.  Discomfort in other areas of the upper body. Symptoms can include pain or discomfort in one or both arms, the back, neck, jaw, or stomach.  Shortness of breath with or without chest discomfort.  Other signs may include breaking out in a cold sweat, nausea, or lightheadedness. Don't wait more than five minutes to call 911 - MINUTES MATTER! Fast action can save your life. Calling 911 is almost always the fastest way to get lifesaving treatment. Emergency Medical Services staff can begin treatment when they arrive -- up to an hour sooner than if someone gets to the hospital by car. The discharge information has been reviewed with the patient and spouse. The patient and spouse verbalized understanding.   Discharge medications reviewed with the patient and spouse and appropriate educational materials and side effects teaching were provided. ___________________________________________________________________________________________________________________________________    Patient armband removed and shredded.

## 2018-01-07 NOTE — PROGRESS NOTES
Clinical Pharmacy Note: IV to PO Automatic Conversion    Please note: Maynor Escobedo medication (ciprofloxacin) has been changed from IV to PO based on the following critiera:    - Patient is taking scheduled oral medications  - Patient is tolerating tube feeds at goal rate or a full liquid, soft or regular diet    This IV to PO conversion is based on the [employer] approved automatic conversion policy for eligible patients. Please call with questions.      Thanks,  Sasha Roe, PHARMD

## 2018-01-07 NOTE — PROGRESS NOTES
Received bedside shift report from Kirsty Scott, RN. Pt is resting in bed, pt complaining of nausea. White board updated, call bell within reach. 5577 Pt complaining of nausea, nibbling on crackers and sipping on ginger ale. Administered PRN zofran. Pt sitting in chair. 0936 Pt resting in bed, administered AM medications. 1102 Pt ambulated in hallway for 3 laps around unit. NAD noted, no nausea noted. 65 Pt ambulating in hallway with wife, NAD noted. Pt states he has walked 15 laps. 80 Updated Dr. Nydia Ponce on pt's status, Dr. Nydia Ponce to look at labs and put D/C orders in if they are suitable. 6621 I have reviewed discharge instructions with the patient and spouse. The patient and spouse verbalized understanding.

## 2018-01-07 NOTE — ROUTINE PROCESS
Bedside and Verbal shift change report given to Ruby Walker RN (oncoming nurse) by Wicho Wilkinson RN (offgoing nurse). Report included the following information SBAR, OR Summary, Procedure Summary, Intake/Output, MAR and Recent Results.

## 2018-01-25 NOTE — ANCILLARY DISCHARGE INSTRUCTIONS
[facility]  Discharge Phone Call       After-Care Discharge Phone Call Questions: no answer     Were you able to get your prescriptions filled? Comment:      [] Yes  []No    Comment if answer is \"No\"   Are you taking your medication(s) as your doctor ordered? Do you understand the purpose of your medications? Comment:    [] Yes  []No    Comment if answer is \"No\"   Are you taking any other medications that are not on the list?  Comment:      [] Yes  []No    Comment if answer is \"Yes\"   Do you have any questions about your medications? Are you aware of potential side effects? Comment:    [] Yes  []No    Comment if answer is \"Yes\"   Did you make your follow-up appointments (if the hospital did not do this before  discharge)? Comment:    [] Yes  []No    Comment if answer is \"No\"   Is there any reason you might not be able to keep your follow-up appointments? Comment:     [] Yes  []No    Comment if answer is \"Yes\"   Do you have any questions about your care plan? Are you aware of what health problems to be alert for? Comment:    [] Yes  []No    Comment if answer is \"Yes\"   Do you have a good understanding of how you should manage your health? Comment:    [] Yes  []No    Comment if answer is \"Yes\"   Do you know which symptoms to watch for that would mean you would need to call your doctor right away? Comment:      [] Yes  []No    Comment if answer is \"No\"   Do you have any questions about the follow up process or any instructions that we have provided? Comment:    [] Yes  []No    Comment if answer is \"Yes\"   Did staff take your preferences into account?         [] Yes  []No    Comment if answer is \"Yes\"

## (undated) DEVICE — DISPOSABLE SUCTION/IRRIGATOR TUBE SET WITH TIP: Brand: AHTO

## (undated) DEVICE — ACCESS PLATFORM FOR MINIMALLY INVASIVE SURGERY.: Brand: GELPORT® LAPAROSCOPIC  SYSTEM

## (undated) DEVICE — MEDI-VAC SUCTION HANDLE REGULAR CAPACITY: Brand: CARDINAL HEALTH

## (undated) DEVICE — THIS PRODUCT IS SINGLE USE AND INTENDED TO BE USED FOR BLUNT DISSECTION OF TISSUE.: Brand: ASPEN® ENDOSCOPIC KITTNER, SINGLE TIP

## (undated) DEVICE — ELECTRO LUBE IS A SINGLE PATIENT USE DEVICE THAT IS INTENDED TO BE USED ON ELECTROSURGICAL ELECTRODES TO REDUCE STICKING.: Brand: KEY SURGICAL ELECTRO LUBE

## (undated) DEVICE — SHEET,DRAPE,70X100,STERILE: Brand: MEDLINE

## (undated) DEVICE — TRAY CATH OD16FR SIL URIN M STATLOK STBL DEV SURSTP

## (undated) DEVICE — APPLICATOR SEAL FLOSEAL 5MM+ --

## (undated) DEVICE — SPONGE SURG WHT KTNR DISECT RADPQ ST

## (undated) DEVICE — KENDALL SCD EXPRESS SLEEVES, KNEE LENGTH, MEDIUM: Brand: KENDALL SCD

## (undated) DEVICE — TIP COVER ACCESSORY

## (undated) DEVICE — SPECIMEN RETRIEVAL POUCH: Brand: ENDO CATCH GOLD

## (undated) DEVICE — PACKING 8004007 NEURAY 200PK 13X76MM: Brand: NEURAY ®

## (undated) DEVICE — Device

## (undated) DEVICE — STERILE POLYISOPRENE POWDER-FREE SURGICAL GLOVES: Brand: PROTEXIS

## (undated) DEVICE — SUTURE PDS II SZ 2-0 L27IN ABSRB VLT L36MM CT-1 1/2 CIR Z339H

## (undated) DEVICE — CLIP LIG ABSRB HEM-LOK LG PUR --

## (undated) DEVICE — CARTRIDGE CLP LIG HEMLOK GRN --

## (undated) DEVICE — REM POLYHESIVE ADULT PATIENT RETURN ELECTRODE: Brand: VALLEYLAB

## (undated) DEVICE — ECOSAC

## (undated) DEVICE — ARM DRAPE

## (undated) DEVICE — VESSEL LOOPS,MAXI, BLUE: Brand: DEVON

## (undated) DEVICE — VISUALIZATION SYSTEM: Brand: CLEARIFY

## (undated) DEVICE — INSTRMT SET WND CLSR SUT PASS --

## (undated) DEVICE — STAPLER SKIN H3.9MM WIRE DIA0.58MM CRWN 6.9MM 35 STPL FIX

## (undated) DEVICE — INTENDED FOR TISSUE SEPARATION, AND OTHER PROCEDURES THAT REQUIRE A SHARP SURGICAL BLADE TO PUNCTURE OR CUT.: Brand: BARD-PARKER ® STAINLESS STEEL BLADES

## (undated) DEVICE — NEEDLE HYPO 25GA L1.5IN BVL ORIENTED ECLIPSE

## (undated) DEVICE — LIGHT HANDLE: Brand: DEVON

## (undated) DEVICE — APPLIER CLP M/L SHFT DIA5MM 15 LIG LIGAMAX 5

## (undated) DEVICE — CLIP SUT ENDOSCP F/2-0/3-0/4-0 -- LAPRA-TY

## (undated) DEVICE — (D)PREP SKN CHLRAPRP APPL 26ML -- CONVERT TO ITEM 371833

## (undated) DEVICE — BODY ALIGNER: Brand: DEROYAL

## (undated) DEVICE — SPONGE HEMOSTAT CELLULS 4X8IN -- SURGICEL

## (undated) DEVICE — PENROSE TUBING RADIOPAQUE: Brand: ARGYLE

## (undated) DEVICE — (D)CAP REDUC 1 SEAL ENDOPTH -- DISC W/NO SUB

## (undated) DEVICE — THE ECHELON FLEX POWERED PLUS ARTICULATING ENDOSCOPIC LINEAR CUTTERS ARE STERILE, SINGLE PATIENT USE INSTRUMENTS THAT SIMULTANEOUSLYCUT AND STAPLE TISSUE. THERE ARE SIX STAGGERED ROWS OF STAPLES, THREE ON EITHER SIDE OF THE CUT LINE. THE ECHELON FLEX 45 POWERED PLUSINSTRUMENTS HAVE A STAPLE LINE THAT IS APPROXIMATELY 45 MM LONG AND A CUT LINE THAT IS APPROXIMATELY 42 MM LONG. THE SHAFT CAN ROTATE FREELYIN BOTH DIRECTIONS AND AN ARTICULATION MECHANISM ENABLES THE DISTAL PORTION OF THE SHAFT TO PIVOT TO FACILITATE LATERAL ACCESS TO THE OPERATIVESITE.THE INSTRUMENTS ARE PACKAGED WITH A PRIMARY LITHIUM BATTERY PACK THAT MUST BE INSTALLED PRIOR TO USE. THERE ARE SPECIFIC REQUIREMENTS FORDISPOSING OF THE BATTERY PACK. REFER TO THE BATTERY PACK DISPOSAL SECTION.THE INSTRUMENTS ARE PACKAGED WITHOUT A RELOAD AND MUST BE LOADED PRIOR TO USE. A STAPLE RETAINING CAP ON THE RELOAD PROTECTS THE STAPLE LEGPOINTS DURING SHIPPING AND TRANSPORTATION. THE INSTRUMENTS’ LOCK-OUT FEATURE IS DESIGNED TO PREVENT A USED OR IMPROPERLY INSTALLED RELOADFROM BEING REFIRED OR AN INSTRUMENT FROM BEING FIRED WITHOUT A RELOAD.: Brand: ECHELON FLEX

## (undated) DEVICE — BLADELESS OBTURATOR: Brand: WECK VISTA

## (undated) DEVICE — BLADE ASSEMB CLP HAIR FINE --

## (undated) DEVICE — DERMABOND SKIN ADH 0.7ML -- DERMABOND ADVANCED 12/BX

## (undated) DEVICE — SLEEVE TRCR L100MM DIA5MM UNIV STBL FOR BLDELSS DIL TIP

## (undated) DEVICE — 3M™ DURAPORE™ SURGICAL TAPE 1538-0, 1/2 INCH X 10 YARD (1,25CM X 9,1M), 24 ROLLS/BOX: Brand: 3M™ DURAPORE™

## (undated) DEVICE — COVER LT HNDL BLU PLAS

## (undated) DEVICE — POUCH SPEC RETRV SHFT 15MM 13X23CM GRN POLYUR DBL RNG HNDL

## (undated) DEVICE — SUTURE MCRYL SZ 4-0 L18IN ABSRB UD L19MM PS-2 3/8 CIR PRIM Y496G

## (undated) DEVICE — SYR 10ML CTRL LR LCK NSAF LF --

## (undated) DEVICE — COLUMN DRAPE

## (undated) DEVICE — SEAL UNIV 5-8MM DISP BX/10 -- DA VINCI XI - SNGL USE

## (undated) DEVICE — RELOAD STPL H2X0.75MM DIA45MM GRY MESENTERY/THIN TISS NAT

## (undated) DEVICE — SOL IRR NACL 0.9% 500ML POUR --

## (undated) DEVICE — TAPE UMB 1/8X30IN MP COT WHT --

## (undated) DEVICE — BLADELESS OPTICAL TROCAR WITH FIXATION CANNULA: Brand: VERSAPORT

## (undated) DEVICE — SUTURE PROL SZ 4-0 L18IN NONABSORBABLE BLU L13MM P-3 3/8 8699G

## (undated) DEVICE — DECANTER VI C-FLO LF --

## (undated) DEVICE — (D)TROCAR ENDO BLDELSS 10-12MM -- DISC BY MFR US ITEM 162087

## (undated) DEVICE — DEVON™ KNEE AND BODY STRAP 60" X 3" (1.5 M X 7.6 CM): Brand: DEVON

## (undated) DEVICE — (D)TROCAR ENDOSCP SLV 10-12MM -- DISC BY MFR NO SUB

## (undated) DEVICE — UNIVERSAL FIXATION CANNULA: Brand: VERSAONE

## (undated) DEVICE — FLOSEAL MATRIX IS INDICATED IN SURGICAL PROCEDURES (OTHER THAN IN OPHTHALMIC) AS AN ADJUNCT TO HEMOSTASIS WHEN CONTROL OF BLEEDING BY LIGATURE OR CONVENTIONALPROCEDURES IS INEFFECTIVE OR IMPRACTICAL.: Brand: FLOSEAL HEMOSTATIC MATRIX

## (undated) DEVICE — SHEARS: Brand: ENDO SHEARS